# Patient Record
Sex: FEMALE | Race: WHITE
[De-identification: names, ages, dates, MRNs, and addresses within clinical notes are randomized per-mention and may not be internally consistent; named-entity substitution may affect disease eponyms.]

---

## 2017-09-22 ENCOUNTER — HOSPITAL ENCOUNTER (EMERGENCY)
Dept: HOSPITAL 83 - ED | Age: 27
Discharge: HOME | End: 2017-09-22
Payer: COMMERCIAL

## 2017-09-22 VITALS — BODY MASS INDEX: 28.32 KG/M2 | WEIGHT: 150 LBS | HEIGHT: 60.98 IN

## 2017-09-22 DIAGNOSIS — Z88.6: ICD-10-CM

## 2017-09-22 DIAGNOSIS — Z98.890: ICD-10-CM

## 2017-09-22 DIAGNOSIS — Z88.8: ICD-10-CM

## 2017-09-22 DIAGNOSIS — Z88.2: ICD-10-CM

## 2017-09-22 DIAGNOSIS — Z90.49: ICD-10-CM

## 2017-09-22 DIAGNOSIS — R30.0: Primary | ICD-10-CM

## 2017-09-22 DIAGNOSIS — K21.9: ICD-10-CM

## 2017-09-22 DIAGNOSIS — Z79.899: ICD-10-CM

## 2017-09-22 DIAGNOSIS — Z88.1: ICD-10-CM

## 2017-09-22 LAB
APPEARANCE UR: (no result)
BACTERIA #/AREA URNS HPF: (no result) /[HPF]
BILIRUB UR QL STRIP: NEGATIVE
COLOR UR: YELLOW
EPI CELLS #/AREA URNS HPF: (no result) /[HPF]
GLUCOSE UR QL: NEGATIVE
HGB UR QL STRIP: NEGATIVE
KETONES UR QL STRIP: NEGATIVE
LEUKOCYTE ESTERASE UR QL STRIP: NEGATIVE
NITRITE UR QL STRIP: NEGATIVE
PH UR STRIP: 7 [PH] (ref 5–9)
RBC #/AREA URNS HPF: (no result) RBC/HPF (ref 0–2)
SP GR UR: 1.01 (ref 1–1.03)
UROBILINOGEN UR STRIP-MCNC: 0.2 E.U./DL (ref 0.2–1)
WBC #/AREA URNS HPF: (no result) WBC/HPF (ref 0–5)

## 2017-10-05 ENCOUNTER — HOSPITAL ENCOUNTER (EMERGENCY)
Dept: HOSPITAL 83 - ED | Age: 27
LOS: 1 days | Discharge: HOME | End: 2017-10-06
Payer: COMMERCIAL

## 2017-10-05 VITALS — WEIGHT: 154 LBS | BODY MASS INDEX: 30.23 KG/M2 | HEIGHT: 60 IN

## 2017-10-05 DIAGNOSIS — Z88.1: ICD-10-CM

## 2017-10-05 DIAGNOSIS — Z88.2: ICD-10-CM

## 2017-10-05 DIAGNOSIS — R10.9: Primary | ICD-10-CM

## 2017-10-05 DIAGNOSIS — R11.2: ICD-10-CM

## 2017-10-05 LAB
ALBUMIN SERPL-MCNC: 3.9 GM/DL (ref 3.1–4.5)
ALP SERPL-CCNC: 48 U/L (ref 45–117)
ALT SERPL W P-5'-P-CCNC: 20 U/L (ref 12–78)
APPEARANCE UR: CLEAR
AST SERPL-CCNC: 15 IU/L (ref 3–35)
B-HCG SERPL-ACNC: NEGATIVE
BACTERIA #/AREA URNS HPF: (no result) /[HPF]
BASOPHILS # BLD AUTO: 0 10*3/UL (ref 0–0.1)
BASOPHILS NFR BLD AUTO: 0.4 % (ref 0–1)
BILIRUB UR QL STRIP: NEGATIVE
BUN SERPL-MCNC: 11 MG/DL (ref 7–24)
CHLORIDE SERPL-SCNC: 106 MMOL/L (ref 98–107)
COLOR UR: YELLOW
CREAT SERPL-MCNC: 0.66 MG/DL (ref 0.55–1.02)
EOSINOPHIL # BLD AUTO: 0.2 10*3/UL (ref 0–0.4)
EOSINOPHIL # BLD AUTO: 3 % (ref 1–4)
EPI CELLS #/AREA URNS HPF: (no result) /[HPF]
ERYTHROCYTE [DISTWIDTH] IN BLOOD BY AUTOMATED COUNT: 13.4 % (ref 0–14.5)
GLUCOSE UR QL: NEGATIVE
HCT VFR BLD AUTO: 38.6 % (ref 37–47)
HGB BLD-MCNC: 12.8 G/DL (ref 12–16)
HGB UR QL STRIP: (no result)
KETONES UR QL STRIP: NEGATIVE
LEUKOCYTE ESTERASE UR QL STRIP: (no result)
LYMPHOCYTES # BLD AUTO: 1.6 10*3/UL (ref 1.3–4.4)
LYMPHOCYTES NFR BLD AUTO: 24.2 % (ref 27–41)
MCH RBC QN AUTO: 29.6 PG (ref 27–31)
MCHC RBC AUTO-ENTMCNC: 33.2 G/DL (ref 33–37)
MCV RBC AUTO: 89.4 FL (ref 81–99)
MONOCYTES # BLD AUTO: 0.9 10*3/UL (ref 0.1–1)
MONOCYTES NFR BLD MANUAL: 13.2 % (ref 3–9)
NEUT #: 3.9 10*3/UL (ref 2.3–7.9)
NEUT %: 58.8 % (ref 47–73)
NITRITE UR QL STRIP: NEGATIVE
NRBC BLD QL AUTO: 0 % (ref 0–0)
PH UR STRIP: 7 [PH] (ref 5–9)
PLATELET # BLD AUTO: 236 10*3/UL (ref 130–400)
PMV BLD AUTO: 9.8 FL (ref 9.6–12.3)
POTASSIUM SERPL-SCNC: 3.8 MMOL/L (ref 3.5–5.1)
PROT SERPL-MCNC: 7.5 GM/DL (ref 6.4–8.2)
RBC # BLD AUTO: 4.32 10*6/UL (ref 4.1–5.1)
RBC #/AREA URNS HPF: (no result) RBC/HPF (ref 0–2)
SODIUM SERPL-SCNC: 140 MMOL/L (ref 136–145)
SP GR UR: 1.01 (ref 1–1.03)
UROBILINOGEN UR STRIP-MCNC: 1 E.U./DL (ref 0.2–1)
WBC #/AREA URNS HPF: (no result) WBC/HPF (ref 0–5)
WBC NRBC COR # BLD AUTO: 6.7 10*3/UL (ref 4.8–10.8)

## 2017-10-11 ENCOUNTER — HOSPITAL ENCOUNTER (EMERGENCY)
Dept: HOSPITAL 83 - ED | Age: 27
Discharge: HOME | End: 2017-10-11
Payer: COMMERCIAL

## 2017-10-11 VITALS — WEIGHT: 154 LBS | BODY MASS INDEX: 30.23 KG/M2 | HEIGHT: 60 IN

## 2017-10-11 DIAGNOSIS — F41.9: Primary | ICD-10-CM

## 2017-10-11 DIAGNOSIS — Z88.2: ICD-10-CM

## 2017-10-11 DIAGNOSIS — Z88.1: ICD-10-CM

## 2017-10-11 DIAGNOSIS — Z88.6: ICD-10-CM

## 2017-10-11 DIAGNOSIS — F17.200: ICD-10-CM

## 2017-10-19 LAB
BASOPHILS # BLD AUTO: 0 10*3/UL (ref 0–0.1)
BASOPHILS NFR BLD AUTO: 0.6 % (ref 0–1)
EOSINOPHIL # BLD AUTO: 0.1 10*3/UL (ref 0–0.4)
EOSINOPHIL # BLD AUTO: 1.8 % (ref 1–4)
ERYTHROCYTE [DISTWIDTH] IN BLOOD BY AUTOMATED COUNT: 13.7 % (ref 0–14.5)
HCT VFR BLD AUTO: 42.1 % (ref 37–47)
HGB BLD-MCNC: 13.9 G/DL (ref 12–16)
LYMPHOCYTES # BLD AUTO: 1.5 10*3/UL (ref 1.3–4.4)
LYMPHOCYTES NFR BLD AUTO: 30.5 % (ref 27–41)
MCH RBC QN AUTO: 30.2 PG (ref 27–31)
MCHC RBC AUTO-ENTMCNC: 33 G/DL (ref 33–37)
MCV RBC AUTO: 91.3 FL (ref 81–99)
MONOCYTES # BLD AUTO: 0.6 10*3/UL (ref 0.1–1)
MONOCYTES NFR BLD MANUAL: 11.6 % (ref 3–9)
NEUT #: 2.7 10*3/UL (ref 2.3–7.9)
NEUT %: 54.9 % (ref 47–73)
NRBC BLD QL AUTO: 0 10*3/UL (ref 0–0)
PLATELET # BLD AUTO: 234 10*3/UL (ref 130–400)
PMV BLD AUTO: 9.7 FL (ref 9.6–12.3)
RBC # BLD AUTO: 4.61 10*6/UL (ref 4.1–5.1)
WBC NRBC COR # BLD AUTO: 5 10*3/UL (ref 4.8–10.8)

## 2017-10-23 ENCOUNTER — HOSPITAL ENCOUNTER (OUTPATIENT)
Dept: HOSPITAL 83 - SDC | Age: 27
Discharge: HOME | End: 2017-10-23
Attending: OBSTETRICS & GYNECOLOGY
Payer: COMMERCIAL

## 2017-10-23 VITALS — SYSTOLIC BLOOD PRESSURE: 102 MMHG | DIASTOLIC BLOOD PRESSURE: 65 MMHG

## 2017-10-23 VITALS — HEIGHT: 60 IN | WEIGHT: 150 LBS | BODY MASS INDEX: 29.45 KG/M2

## 2017-10-23 VITALS — DIASTOLIC BLOOD PRESSURE: 63 MMHG | SYSTOLIC BLOOD PRESSURE: 102 MMHG

## 2017-10-23 VITALS — DIASTOLIC BLOOD PRESSURE: 68 MMHG | SYSTOLIC BLOOD PRESSURE: 110 MMHG

## 2017-10-23 VITALS — SYSTOLIC BLOOD PRESSURE: 112 MMHG | DIASTOLIC BLOOD PRESSURE: 67 MMHG

## 2017-10-23 VITALS — SYSTOLIC BLOOD PRESSURE: 102 MMHG | DIASTOLIC BLOOD PRESSURE: 57 MMHG

## 2017-10-23 VITALS — DIASTOLIC BLOOD PRESSURE: 46 MMHG

## 2017-10-23 VITALS — DIASTOLIC BLOOD PRESSURE: 51 MMHG

## 2017-10-23 DIAGNOSIS — N30.10: ICD-10-CM

## 2017-10-23 DIAGNOSIS — Z80.9: ICD-10-CM

## 2017-10-23 DIAGNOSIS — N80.3: Primary | ICD-10-CM

## 2017-10-23 DIAGNOSIS — K66.0: ICD-10-CM

## 2017-10-23 DIAGNOSIS — K21.9: ICD-10-CM

## 2017-10-23 DIAGNOSIS — F17.210: ICD-10-CM

## 2017-10-23 DIAGNOSIS — Z82.49: ICD-10-CM

## 2017-10-23 DIAGNOSIS — F41.0: ICD-10-CM

## 2017-10-23 DIAGNOSIS — Z98.890: ICD-10-CM

## 2017-10-23 DIAGNOSIS — N94.10: ICD-10-CM

## 2017-10-23 DIAGNOSIS — Z98.51: ICD-10-CM

## 2017-10-23 DIAGNOSIS — E66.01: ICD-10-CM

## 2018-01-01 ENCOUNTER — HOSPITAL ENCOUNTER (EMERGENCY)
Dept: HOSPITAL 83 - ED | Age: 28
Discharge: HOME | End: 2018-01-01
Payer: COMMERCIAL

## 2018-01-01 VITALS — BODY MASS INDEX: 29.45 KG/M2 | HEIGHT: 60 IN | WEIGHT: 150 LBS

## 2018-01-01 DIAGNOSIS — Z98.51: ICD-10-CM

## 2018-01-01 DIAGNOSIS — Z79.899: ICD-10-CM

## 2018-01-01 DIAGNOSIS — Z88.1: ICD-10-CM

## 2018-01-01 DIAGNOSIS — Z88.8: ICD-10-CM

## 2018-01-01 DIAGNOSIS — N39.0: Primary | ICD-10-CM

## 2018-01-01 DIAGNOSIS — Z88.6: ICD-10-CM

## 2018-01-01 DIAGNOSIS — Z88.2: ICD-10-CM

## 2018-01-01 LAB
APPEARANCE UR: CLEAR
BACTERIA #/AREA URNS HPF: (no result) /[HPF]
BILIRUB UR QL STRIP: NEGATIVE
COLOR UR: YELLOW
EPI CELLS #/AREA URNS HPF: (no result) /[HPF]
GLUCOSE UR QL: NEGATIVE
HGB UR QL STRIP: NEGATIVE
KETONES UR QL STRIP: NEGATIVE
LEUKOCYTE ESTERASE UR QL STRIP: NEGATIVE
NITRITE UR QL STRIP: NEGATIVE
PH UR STRIP: 5 [PH] (ref 5–9)
RBC #/AREA URNS HPF: (no result) RBC/HPF (ref 0–2)
SP GR UR: 1.01 (ref 1–1.03)
UROBILINOGEN UR STRIP-MCNC: 0.2 E.U./DL (ref 0.2–1)
WBC #/AREA URNS HPF: (no result) WBC/HPF (ref 0–5)
YEAST #/AREA URNS HPF: (no result) /[HPF]

## 2018-02-28 ENCOUNTER — HOSPITAL ENCOUNTER (EMERGENCY)
Dept: HOSPITAL 83 - ED | Age: 28
Discharge: HOME | End: 2018-02-28
Payer: COMMERCIAL

## 2018-02-28 VITALS — WEIGHT: 140 LBS | BODY MASS INDEX: 27.48 KG/M2 | HEIGHT: 60 IN

## 2018-02-28 DIAGNOSIS — Z88.8: ICD-10-CM

## 2018-02-28 DIAGNOSIS — Z79.899: ICD-10-CM

## 2018-02-28 DIAGNOSIS — B34.9: Primary | ICD-10-CM

## 2018-02-28 DIAGNOSIS — Z88.2: ICD-10-CM

## 2018-02-28 DIAGNOSIS — Z88.1: ICD-10-CM

## 2018-02-28 DIAGNOSIS — Z88.6: ICD-10-CM

## 2018-04-19 ENCOUNTER — HOSPITAL ENCOUNTER (EMERGENCY)
Dept: HOSPITAL 83 - ED | Age: 28
Discharge: HOME | End: 2018-04-19
Payer: COMMERCIAL

## 2018-04-19 VITALS — WEIGHT: 142 LBS | BODY MASS INDEX: 26.81 KG/M2 | HEIGHT: 60.98 IN

## 2018-04-19 DIAGNOSIS — Z79.899: ICD-10-CM

## 2018-04-19 DIAGNOSIS — Z90.49: ICD-10-CM

## 2018-04-19 DIAGNOSIS — Z88.2: ICD-10-CM

## 2018-04-19 DIAGNOSIS — Z88.1: ICD-10-CM

## 2018-04-19 DIAGNOSIS — Z88.8: ICD-10-CM

## 2018-04-19 DIAGNOSIS — Z98.51: ICD-10-CM

## 2018-04-19 DIAGNOSIS — R30.0: Primary | ICD-10-CM

## 2018-04-19 DIAGNOSIS — Z98.890: ICD-10-CM

## 2018-04-19 LAB
APPEARANCE UR: CLEAR
BACTERIA #/AREA URNS HPF: (no result) /[HPF]
BILIRUB UR QL STRIP: NEGATIVE
COLOR UR: YELLOW
EPI CELLS #/AREA URNS HPF: (no result) /[HPF]
GLUCOSE UR QL: NEGATIVE
HGB UR QL STRIP: NEGATIVE
KETONES UR QL STRIP: NEGATIVE
LEUKOCYTE ESTERASE UR QL STRIP: NEGATIVE
NITRITE UR QL STRIP: NEGATIVE
PH UR STRIP: 6.5 [PH] (ref 5–9)
RBC #/AREA URNS HPF: (no result) RBC/HPF (ref 0–2)
SP GR UR: 1.01 (ref 1–1.03)
UROBILINOGEN UR STRIP-MCNC: 0.2 E.U./DL (ref 0.2–1)
WBC #/AREA URNS HPF: (no result) WBC/HPF (ref 0–5)

## 2018-04-21 ENCOUNTER — HOSPITAL ENCOUNTER (OUTPATIENT)
Dept: HOSPITAL 83 - LAB | Age: 28
Discharge: HOME | End: 2018-04-21
Attending: UROLOGY
Payer: COMMERCIAL

## 2018-04-21 DIAGNOSIS — N28.89: ICD-10-CM

## 2018-04-21 DIAGNOSIS — N32.89: Primary | ICD-10-CM

## 2018-04-21 DIAGNOSIS — N39.0: ICD-10-CM

## 2018-04-21 LAB
APPEARANCE UR: CLEAR
BACTERIA #/AREA URNS HPF: (no result) /[HPF]
BILIRUB UR QL STRIP: NEGATIVE
COLOR UR: YELLOW
EPI CELLS #/AREA URNS HPF: (no result) /[HPF]
GLUCOSE UR QL: NEGATIVE
HGB UR QL STRIP: NEGATIVE
KETONES UR QL STRIP: NEGATIVE
LEUKOCYTE ESTERASE UR QL STRIP: NEGATIVE
NITRITE UR QL STRIP: NEGATIVE
PH UR STRIP: 7 [PH] (ref 5–9)
SP GR UR: <= 1.005 (ref 1–1.03)
UROBILINOGEN UR STRIP-MCNC: 0.2 E.U./DL (ref 0.2–1)
WBC #/AREA URNS HPF: (no result) WBC/HPF (ref 0–5)

## 2018-06-23 ENCOUNTER — HOSPITAL ENCOUNTER (EMERGENCY)
Dept: HOSPITAL 83 - ED | Age: 28
Discharge: HOME | End: 2018-06-23
Payer: COMMERCIAL

## 2018-06-23 VITALS — HEIGHT: 60.98 IN | WEIGHT: 150 LBS | BODY MASS INDEX: 28.32 KG/M2

## 2018-06-23 DIAGNOSIS — Z79.899: ICD-10-CM

## 2018-06-23 DIAGNOSIS — N39.0: Primary | ICD-10-CM

## 2018-06-23 DIAGNOSIS — Z88.2: ICD-10-CM

## 2018-06-23 DIAGNOSIS — Z88.1: ICD-10-CM

## 2018-06-23 DIAGNOSIS — Z88.8: ICD-10-CM

## 2018-06-23 LAB
APPEARANCE UR: CLEAR
BACTERIA #/AREA URNS HPF: (no result) /[HPF]
BILIRUB UR QL STRIP: NEGATIVE
COLOR UR: YELLOW
EPI CELLS #/AREA URNS HPF: (no result) /[HPF]
GLUCOSE UR QL: NEGATIVE
HGB UR QL STRIP: NEGATIVE
KETONES UR QL STRIP: NEGATIVE
LEUKOCYTE ESTERASE UR QL STRIP: (no result)
MUCOUS THREADS URNS QL MICRO: (no result)
NITRITE UR QL STRIP: NEGATIVE
PH UR STRIP: 6 [PH] (ref 5–9)
RBC #/AREA URNS HPF: (no result) RBC/HPF (ref 0–2)
SP GR UR: 1.02 (ref 1–1.03)
UROBILINOGEN UR STRIP-MCNC: 0.2 E.U./DL (ref 0.2–1)
WBC #/AREA URNS HPF: (no result) WBC/HPF (ref 0–5)

## 2018-08-02 ENCOUNTER — HOSPITAL ENCOUNTER (EMERGENCY)
Dept: HOSPITAL 83 - ED | Age: 28
Discharge: HOME | End: 2018-08-02
Payer: COMMERCIAL

## 2018-08-02 VITALS — HEIGHT: 60.98 IN | WEIGHT: 145 LBS | BODY MASS INDEX: 27.38 KG/M2

## 2018-08-02 DIAGNOSIS — Z88.2: ICD-10-CM

## 2018-08-02 DIAGNOSIS — Z88.1: ICD-10-CM

## 2018-08-02 DIAGNOSIS — A08.4: ICD-10-CM

## 2018-08-02 DIAGNOSIS — Z90.49: ICD-10-CM

## 2018-08-02 DIAGNOSIS — Z98.51: ICD-10-CM

## 2018-08-02 DIAGNOSIS — N20.0: Primary | ICD-10-CM

## 2018-08-02 DIAGNOSIS — K21.9: ICD-10-CM

## 2018-08-02 DIAGNOSIS — Z79.899: ICD-10-CM

## 2018-08-02 DIAGNOSIS — Z98.890: ICD-10-CM

## 2018-08-02 LAB
ALBUMIN SERPL-MCNC: 4.1 GM/DL (ref 3.1–4.5)
ALP SERPL-CCNC: 44 U/L (ref 45–117)
ALT SERPL W P-5'-P-CCNC: 19 U/L (ref 12–78)
APPEARANCE UR: CLEAR
AST SERPL-CCNC: 23 IU/L (ref 3–35)
BACTERIA #/AREA URNS HPF: (no result) /[HPF]
BASOPHILS # BLD AUTO: 0 10*3/UL (ref 0–0.1)
BASOPHILS NFR BLD AUTO: 0.5 % (ref 0–1)
BILIRUB UR QL STRIP: NEGATIVE
BUN SERPL-MCNC: 12 MG/DL (ref 7–24)
CHLORIDE SERPL-SCNC: 106 MMOL/L (ref 98–107)
COLOR UR: YELLOW
CREAT SERPL-MCNC: 0.99 MG/DL (ref 0.55–1.02)
EOSINOPHIL # BLD AUTO: 0.2 10*3/UL (ref 0–0.4)
EOSINOPHIL # BLD AUTO: 2.8 % (ref 1–4)
ERYTHROCYTE [DISTWIDTH] IN BLOOD BY AUTOMATED COUNT: 13.1 % (ref 0–14.5)
GLUCOSE UR QL: NEGATIVE
HCT VFR BLD AUTO: 40.8 % (ref 37–47)
HGB BLD-MCNC: 13.7 G/DL (ref 12–16)
HGB UR QL STRIP: NEGATIVE
KETONES UR QL STRIP: (no result)
LEUKOCYTE ESTERASE UR QL STRIP: NEGATIVE
LIPASE SERPL-CCNC: 81 U/L (ref 73–393)
LYMPHOCYTES # BLD AUTO: 1.8 10*3/UL (ref 1.3–4.4)
LYMPHOCYTES NFR BLD AUTO: 28.7 % (ref 27–41)
MCH RBC QN AUTO: 30 PG (ref 27–31)
MCHC RBC AUTO-ENTMCNC: 33.6 G/DL (ref 33–37)
MCV RBC AUTO: 89.5 FL (ref 81–99)
MONOCYTES # BLD AUTO: 0.7 10*3/UL (ref 0.1–1)
MONOCYTES NFR BLD MANUAL: 10.7 % (ref 3–9)
MUCOUS THREADS URNS QL MICRO: (no result)
NEUT #: 3.6 10*3/UL (ref 2.3–7.9)
NEUT %: 57 % (ref 47–73)
NITRITE UR QL STRIP: NEGATIVE
NRBC BLD QL AUTO: 0 10*3/UL (ref 0–0)
PH UR STRIP: 7 [PH] (ref 5–9)
PLATELET # BLD AUTO: 246 10*3/UL (ref 130–400)
PMV BLD AUTO: 10.3 FL (ref 9.6–12.3)
POTASSIUM SERPL-SCNC: 4.2 MMOL/L (ref 3.5–5.1)
PROT SERPL-MCNC: 7.7 GM/DL (ref 6.4–8.2)
RBC # BLD AUTO: 4.56 10*6/UL (ref 4.1–5.1)
SODIUM SERPL-SCNC: 140 MMOL/L (ref 136–145)
SP GR UR: 1.01 (ref 1–1.03)
UROBILINOGEN UR STRIP-MCNC: 1 E.U./DL (ref 0.2–1)
WBC #/AREA URNS HPF: (no result) WBC/HPF (ref 0–5)
WBC NRBC COR # BLD AUTO: 6.4 10*3/UL (ref 4.8–10.8)

## 2018-09-14 ENCOUNTER — HOSPITAL ENCOUNTER (EMERGENCY)
Dept: HOSPITAL 83 - ED | Age: 28
Discharge: HOME | End: 2018-09-14
Payer: COMMERCIAL

## 2018-09-14 VITALS — BODY MASS INDEX: 28.47 KG/M2 | WEIGHT: 145 LBS | HEIGHT: 60 IN

## 2018-09-14 DIAGNOSIS — Z79.899: ICD-10-CM

## 2018-09-14 DIAGNOSIS — Z87.891: ICD-10-CM

## 2018-09-14 DIAGNOSIS — Z88.2: ICD-10-CM

## 2018-09-14 DIAGNOSIS — Z88.8: ICD-10-CM

## 2018-09-14 DIAGNOSIS — K21.9: ICD-10-CM

## 2018-09-14 DIAGNOSIS — Z88.1: ICD-10-CM

## 2018-09-14 DIAGNOSIS — J06.9: Primary | ICD-10-CM

## 2018-09-14 DIAGNOSIS — Z88.6: ICD-10-CM

## 2018-09-14 DIAGNOSIS — Z90.49: ICD-10-CM

## 2018-09-22 ENCOUNTER — HOSPITAL ENCOUNTER (EMERGENCY)
Dept: HOSPITAL 83 - ED | Age: 28
Discharge: HOME | End: 2018-09-22
Payer: COMMERCIAL

## 2018-09-22 VITALS — WEIGHT: 145 LBS | HEIGHT: 60.98 IN | BODY MASS INDEX: 27.38 KG/M2

## 2018-09-22 DIAGNOSIS — Z88.8: ICD-10-CM
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
## 2018-09-22 DIAGNOSIS — K21.9: ICD-10-CM
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
## 2018-09-22 DIAGNOSIS — Z88.1: ICD-10-CM

## 2018-09-22 DIAGNOSIS — Z87.442: ICD-10-CM

## 2018-09-22 DIAGNOSIS — Z88.2: ICD-10-CM
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
## 2018-09-22 DIAGNOSIS — S39.012A: Primary | ICD-10-CM
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
## 2018-09-22 DIAGNOSIS — Z88.6: ICD-10-CM

## 2018-09-22 DIAGNOSIS — Z79.899: ICD-10-CM

## 2018-09-22 LAB
APPEARANCE UR: CLEAR
BILIRUB UR QL STRIP: NEGATIVE
COLOR UR: YELLOW
EPI CELLS #/AREA URNS HPF: (no result) /[HPF]
GLUCOSE UR QL: NEGATIVE
HGB UR QL STRIP: (no result)
KETONES UR QL STRIP: NEGATIVE
LEUKOCYTE ESTERASE UR QL STRIP: NEGATIVE
NITRITE UR QL STRIP: NEGATIVE
PH UR STRIP: 6 [PH] (ref 5–9)
RBC #/AREA URNS HPF: (no result) RBC/HPF (ref 0–2)
SP GR UR: 1.01 (ref 1–1.03)
UROBILINOGEN UR STRIP-MCNC: 0.2 E.U./DL (ref 0.2–1)

## 2018-10-07 ENCOUNTER — HOSPITAL ENCOUNTER (EMERGENCY)
Dept: HOSPITAL 83 - ED | Age: 28
Discharge: HOME | End: 2018-10-07
Payer: COMMERCIAL

## 2018-10-07 VITALS — HEIGHT: 60.98 IN | BODY MASS INDEX: 27.38 KG/M2 | WEIGHT: 145 LBS

## 2018-10-07 DIAGNOSIS — Z88.1: ICD-10-CM

## 2018-10-07 DIAGNOSIS — Z88.8: ICD-10-CM

## 2018-10-07 DIAGNOSIS — N39.0: Primary | ICD-10-CM

## 2018-10-07 DIAGNOSIS — Z88.6: ICD-10-CM

## 2018-10-07 DIAGNOSIS — Z88.2: ICD-10-CM

## 2018-10-07 DIAGNOSIS — Z79.899: ICD-10-CM

## 2018-12-22 ENCOUNTER — HOSPITAL ENCOUNTER (EMERGENCY)
Dept: HOSPITAL 83 - ED | Age: 28
Discharge: HOME | End: 2018-12-22
Payer: COMMERCIAL

## 2018-12-22 VITALS — BODY MASS INDEX: 27.94 KG/M2 | WEIGHT: 148 LBS | HEIGHT: 60.98 IN

## 2018-12-22 DIAGNOSIS — Z88.8: ICD-10-CM

## 2018-12-22 DIAGNOSIS — Z79.2: ICD-10-CM

## 2018-12-22 DIAGNOSIS — N89.8: Primary | ICD-10-CM

## 2018-12-22 DIAGNOSIS — Z88.6: ICD-10-CM

## 2018-12-22 DIAGNOSIS — K21.9: ICD-10-CM

## 2018-12-22 DIAGNOSIS — Z79.899: ICD-10-CM

## 2018-12-22 DIAGNOSIS — R31.9: ICD-10-CM

## 2018-12-22 DIAGNOSIS — R35.0: ICD-10-CM

## 2018-12-22 DIAGNOSIS — Z88.2: ICD-10-CM

## 2018-12-22 DIAGNOSIS — Z88.1: ICD-10-CM

## 2018-12-22 LAB
APPEARANCE UR: (no result)
BACTERIA #/AREA URNS HPF: (no result) /[HPF]
BILIRUB UR QL STRIP: NEGATIVE
COLOR UR: YELLOW
GLUCOSE UR QL: NEGATIVE
HGB UR QL STRIP: NEGATIVE
KETONES UR QL STRIP: NEGATIVE
LEUKOCYTE ESTERASE UR QL STRIP: NEGATIVE
MUCOUS THREADS URNS QL MICRO: (no result)
NITRITE UR QL STRIP: NEGATIVE
PH UR STRIP: 7 [PH] (ref 5–9)
RBC #/AREA URNS HPF: (no result) RBC/HPF (ref 0–2)
SP GR UR: 1.02 (ref 1–1.03)
UROBILINOGEN UR STRIP-MCNC: 0.2 E.U./DL (ref 0.2–1)
WBC #/AREA URNS HPF: (no result) WBC/HPF (ref 0–5)

## 2019-01-18 ENCOUNTER — APPOINTMENT (OUTPATIENT)
Dept: CT IMAGING | Age: 29
End: 2019-01-18
Payer: MEDICAID

## 2019-01-18 ENCOUNTER — HOSPITAL ENCOUNTER (EMERGENCY)
Age: 29
Discharge: HOME | End: 2019-01-18
Attending: EMERGENCY MEDICINE
Payer: MEDICAID

## 2019-01-18 VITALS
HEIGHT: 61 IN | BODY MASS INDEX: 27.38 KG/M2 | TEMPERATURE: 98 F | OXYGEN SATURATION: 100 % | RESPIRATION RATE: 14 BRPM | WEIGHT: 145 LBS | HEART RATE: 78 BPM | SYSTOLIC BLOOD PRESSURE: 134 MMHG | DIASTOLIC BLOOD PRESSURE: 80 MMHG

## 2019-01-18 DIAGNOSIS — K59.00 CONSTIPATION, UNSPECIFIED CONSTIPATION TYPE: ICD-10-CM

## 2019-01-18 DIAGNOSIS — R10.9 ABDOMINAL PAIN, UNSPECIFIED ABDOMINAL LOCATION: Primary | ICD-10-CM

## 2019-01-18 LAB
ALBUMIN SERPL-MCNC: 4.2 G/DL (ref 3.5–5.2)
ALP BLD-CCNC: 40 U/L (ref 35–104)
ALT SERPL-CCNC: 12 U/L (ref 0–32)
ANION GAP SERPL CALCULATED.3IONS-SCNC: 11 MMOL/L (ref 7–16)
AST SERPL-CCNC: 16 U/L (ref 0–31)
BACTERIA: NORMAL /HPF
BASOPHILS ABSOLUTE: 0.03 E9/L (ref 0–0.2)
BASOPHILS RELATIVE PERCENT: 0.4 % (ref 0–2)
BILIRUB SERPL-MCNC: 0.5 MG/DL (ref 0–1.2)
BILIRUBIN DIRECT: <0.2 MG/DL (ref 0–0.3)
BILIRUBIN URINE: NEGATIVE
BILIRUBIN, INDIRECT: NORMAL MG/DL (ref 0–1)
BLOOD, URINE: NEGATIVE
BUN BLDV-MCNC: 11 MG/DL (ref 6–20)
CALCIUM SERPL-MCNC: 9.5 MG/DL (ref 8.6–10.2)
CHLORIDE BLD-SCNC: 100 MMOL/L (ref 98–107)
CLARITY: CLEAR
CO2: 24 MMOL/L (ref 22–29)
COLOR: YELLOW
CREAT SERPL-MCNC: 0.6 MG/DL (ref 0.5–1)
EOSINOPHILS ABSOLUTE: 0.16 E9/L (ref 0.05–0.5)
EOSINOPHILS RELATIVE PERCENT: 2.1 % (ref 0–6)
GFR AFRICAN AMERICAN: >60
GFR NON-AFRICAN AMERICAN: >60 ML/MIN/1.73
GLUCOSE BLD-MCNC: 79 MG/DL (ref 74–99)
GLUCOSE URINE: NEGATIVE MG/DL
HCG, URINE, POC: NEGATIVE
HCT VFR BLD CALC: 40.1 % (ref 34–48)
HEMOGLOBIN: 13.4 G/DL (ref 11.5–15.5)
IMMATURE GRANULOCYTES #: 0.02 E9/L
IMMATURE GRANULOCYTES %: 0.3 % (ref 0–5)
KETONES, URINE: NEGATIVE MG/DL
LACTIC ACID: 0.6 MMOL/L (ref 0.5–2.2)
LEUKOCYTE ESTERASE, URINE: NEGATIVE
LIPASE: 19 U/L (ref 13–60)
LYMPHOCYTES ABSOLUTE: 2.28 E9/L (ref 1.5–4)
LYMPHOCYTES RELATIVE PERCENT: 29.7 % (ref 20–42)
Lab: NORMAL
MCH RBC QN AUTO: 30.4 PG (ref 26–35)
MCHC RBC AUTO-ENTMCNC: 33.4 % (ref 32–34.5)
MCV RBC AUTO: 90.9 FL (ref 80–99.9)
MONOCYTES ABSOLUTE: 0.86 E9/L (ref 0.1–0.95)
MONOCYTES RELATIVE PERCENT: 11.2 % (ref 2–12)
NEGATIVE QC PASS/FAIL: NORMAL
NEUTROPHILS ABSOLUTE: 4.32 E9/L (ref 1.8–7.3)
NEUTROPHILS RELATIVE PERCENT: 56.3 % (ref 43–80)
NITRITE, URINE: NEGATIVE
PDW BLD-RTO: 13 FL (ref 11.5–15)
PH UA: 7 (ref 5–9)
PLATELET # BLD: 223 E9/L (ref 130–450)
PMV BLD AUTO: 10 FL (ref 7–12)
POSITIVE QC PASS/FAIL: NORMAL
POTASSIUM SERPL-SCNC: 3.8 MMOL/L (ref 3.5–5)
PROTEIN UA: NEGATIVE MG/DL
RBC # BLD: 4.41 E12/L (ref 3.5–5.5)
RBC UA: NORMAL /HPF (ref 0–2)
SODIUM BLD-SCNC: 135 MMOL/L (ref 132–146)
SPECIFIC GRAVITY UA: <=1.005 (ref 1–1.03)
TOTAL PROTEIN: 7.3 G/DL (ref 6.4–8.3)
UROBILINOGEN, URINE: 0.2 E.U./DL
WBC # BLD: 7.7 E9/L (ref 4.5–11.5)
WBC UA: NORMAL /HPF (ref 0–5)

## 2019-01-18 PROCEDURE — 74176 CT ABD & PELVIS W/O CONTRAST: CPT

## 2019-01-18 PROCEDURE — 87088 URINE BACTERIA CULTURE: CPT

## 2019-01-18 PROCEDURE — 81001 URINALYSIS AUTO W/SCOPE: CPT

## 2019-01-18 PROCEDURE — 6360000002 HC RX W HCPCS: Performed by: EMERGENCY MEDICINE

## 2019-01-18 PROCEDURE — 2580000003 HC RX 258: Performed by: RADIOLOGY

## 2019-01-18 PROCEDURE — 6360000004 HC RX CONTRAST MEDICATION: Performed by: RADIOLOGY

## 2019-01-18 PROCEDURE — 83605 ASSAY OF LACTIC ACID: CPT

## 2019-01-18 PROCEDURE — 36415 COLL VENOUS BLD VENIPUNCTURE: CPT

## 2019-01-18 PROCEDURE — 83690 ASSAY OF LIPASE: CPT

## 2019-01-18 PROCEDURE — 85025 COMPLETE CBC W/AUTO DIFF WBC: CPT

## 2019-01-18 PROCEDURE — 96375 TX/PRO/DX INJ NEW DRUG ADDON: CPT

## 2019-01-18 PROCEDURE — 80076 HEPATIC FUNCTION PANEL: CPT

## 2019-01-18 PROCEDURE — 96374 THER/PROPH/DIAG INJ IV PUSH: CPT

## 2019-01-18 PROCEDURE — 2580000003 HC RX 258: Performed by: EMERGENCY MEDICINE

## 2019-01-18 PROCEDURE — 99284 EMERGENCY DEPT VISIT MOD MDM: CPT

## 2019-01-18 PROCEDURE — 80048 BASIC METABOLIC PNL TOTAL CA: CPT

## 2019-01-18 RX ORDER — DOCUSATE SODIUM 100 MG/1
100 CAPSULE, LIQUID FILLED ORAL 2 TIMES DAILY PRN
Qty: 20 CAPSULE | Refills: 0 | Status: SHIPPED | OUTPATIENT
Start: 2019-01-18 | End: 2019-01-23

## 2019-01-18 RX ORDER — OMEPRAZOLE 40 MG/1
40 CAPSULE, DELAYED RELEASE ORAL DAILY
COMMUNITY

## 2019-01-18 RX ORDER — KETOROLAC TROMETHAMINE 30 MG/ML
15 INJECTION, SOLUTION INTRAMUSCULAR; INTRAVENOUS ONCE
Status: COMPLETED | OUTPATIENT
Start: 2019-01-18 | End: 2019-01-18

## 2019-01-18 RX ORDER — SODIUM CHLORIDE 0.9 % (FLUSH) 0.9 %
10 SYRINGE (ML) INJECTION 2 TIMES DAILY
Status: DISCONTINUED | OUTPATIENT
Start: 2019-01-18 | End: 2019-01-19 | Stop reason: HOSPADM

## 2019-01-18 RX ORDER — 0.9 % SODIUM CHLORIDE 0.9 %
1000 INTRAVENOUS SOLUTION INTRAVENOUS ONCE
Status: COMPLETED | OUTPATIENT
Start: 2019-01-18 | End: 2019-01-18

## 2019-01-18 RX ORDER — ONDANSETRON 2 MG/ML
4 INJECTION INTRAMUSCULAR; INTRAVENOUS
Status: COMPLETED | OUTPATIENT
Start: 2019-01-18 | End: 2019-01-18

## 2019-01-18 RX ADMIN — KETOROLAC TROMETHAMINE 15 MG: 30 INJECTION, SOLUTION INTRAMUSCULAR; INTRAVENOUS at 20:59

## 2019-01-18 RX ADMIN — ONDANSETRON 4 MG: 2 INJECTION INTRAMUSCULAR; INTRAVENOUS at 21:00

## 2019-01-18 RX ADMIN — SODIUM CHLORIDE 1000 ML: 9 INJECTION, SOLUTION INTRAVENOUS at 20:59

## 2019-01-18 RX ADMIN — Medication 10 ML: at 21:39

## 2019-01-18 ASSESSMENT — ENCOUNTER SYMPTOMS
SINUS PRESSURE: 0
SORE THROAT: 0
HEMATOCHEZIA: 0
ABDOMINAL PAIN: 1
COUGH: 0
SHORTNESS OF BREATH: 0
WHEEZING: 0
ABDOMINAL DISTENTION: 0
EYE PAIN: 0
VOMITING: 0
RHINORRHEA: 0
EYE DISCHARGE: 0
BLOOD IN STOOL: 0
NAUSEA: 0
DIARRHEA: 0
CONSTIPATION: 0
BACK PAIN: 0
EYE REDNESS: 0
HEMATEMESIS: 0

## 2019-01-18 ASSESSMENT — PAIN DESCRIPTION - LOCATION: LOCATION: ABDOMEN

## 2019-01-18 ASSESSMENT — PAIN SCALES - GENERAL
PAINLEVEL_OUTOF10: 7

## 2019-01-18 ASSESSMENT — PAIN DESCRIPTION - PAIN TYPE: TYPE: ACUTE PAIN

## 2019-01-18 ASSESSMENT — PAIN DESCRIPTION - PROGRESSION: CLINICAL_PROGRESSION: NOT CHANGED

## 2019-01-21 LAB — URINE CULTURE, ROUTINE: NORMAL

## 2019-10-22 ENCOUNTER — HOSPITAL ENCOUNTER (OUTPATIENT)
Dept: HOSPITAL 83 - CARD | Age: 29
Discharge: HOME | End: 2019-10-22
Attending: INTERNAL MEDICINE
Payer: COMMERCIAL

## 2019-10-22 DIAGNOSIS — R07.9: Primary | ICD-10-CM

## 2019-10-22 DIAGNOSIS — K21.9: ICD-10-CM

## 2019-10-22 DIAGNOSIS — R06.02: ICD-10-CM

## 2019-10-22 NOTE — ST
[place], [place]
 
                               EXERCISE STRESS TEST REPORT
 
        NAME: FARIBA CUADRA                ACCT #: [id number]  
        UNIT #: [id number]                        ROOM:           
        DOCTOR: FLORENCE DELATORRE MD               BIRTHDATE: [birth year]
 
 
DOS: 10/22/2019
 
The patient walked on the Tobi protocol, duration of 5 minutes.  Heart rate is
165, which is 85% ____.  No EKG changes with exercise.  No chest pain with
exercise.  No dysrhythmia with exercise.  Blood pressure and heart rate response
was normal with adequate double product.
 
FINAL IMPRESSION:  No EKG changes with exercise.  No chest pain with exercise. 
No dysrhythmia with exercise.  Blood pressure and heart rate response was normal
with adequate double product.
 
 
 
_________________________________
FLORENCE DELATORRE MD
 
CM:STRESS:EXERCISE STRESS TEST REPORT 
 
 
D: 10/22/19 0720
T: 10/22/19 0752
    
                                                            
FLORENCE DELATORRE MD

## 2019-10-22 NOTE — NUR
INFORMED SIGNED CONSENT OBTAINED FOR STANDARD GXT WITH DR DELATORRE. RESTING EKG
NSR HR 88 /70, STANDING HR 98 BP 98/60. PT COMPLETED 5:00 OF A 2:00
HELEN PROTOCOL WITH PT COMPLETING 1:00 OF STAFE III AT 3.4 MPH AND A 14%
GRADE. PT REACHED A PEAK HR  WHICH REPRESENTS 87% OF PREDICTED MAXIMUM
AND A PEAK BP /80. NO ARRHYTHMIAS NOTED. NO ST CHANAGES SEEN. LAST
RECOVERY . PT IN STABLE CONDITION, HOME TO SELF.

## 2019-11-02 ENCOUNTER — HOSPITAL ENCOUNTER (EMERGENCY)
Dept: HOSPITAL 83 - ED | Age: 29
Discharge: HOME | End: 2019-11-02
Payer: COMMERCIAL

## 2019-11-02 VITALS — WEIGHT: 135 LBS | BODY MASS INDEX: 26.5 KG/M2 | HEIGHT: 60 IN

## 2019-11-02 DIAGNOSIS — N39.0: Primary | ICD-10-CM

## 2019-11-02 DIAGNOSIS — Z79.899: ICD-10-CM

## 2019-11-02 DIAGNOSIS — Z88.8: ICD-10-CM

## 2019-11-02 DIAGNOSIS — Z79.2: ICD-10-CM

## 2019-11-02 DIAGNOSIS — Z88.1: ICD-10-CM

## 2019-11-02 DIAGNOSIS — Z87.442: ICD-10-CM

## 2019-11-02 DIAGNOSIS — K21.9: ICD-10-CM

## 2019-11-02 DIAGNOSIS — E87.6: ICD-10-CM

## 2019-11-02 DIAGNOSIS — Z88.2: ICD-10-CM

## 2019-11-02 LAB
ALBUMIN SERPL-MCNC: 3.6 GM/DL (ref 3.1–4.5)
ALP SERPL-CCNC: 46 U/L (ref 45–117)
ALT SERPL W P-5'-P-CCNC: 22 U/L (ref 12–78)
APPEARANCE UR: (no result)
AST SERPL-CCNC: 19 IU/L (ref 3–35)
BACTERIA #/AREA URNS HPF: (no result) /[HPF]
BASOPHILS # BLD AUTO: 0 10*3/UL (ref 0–0.1)
BASOPHILS NFR BLD AUTO: 0.5 % (ref 0–1)
BILIRUB UR QL STRIP: NEGATIVE
BUN SERPL-MCNC: 9 MG/DL (ref 7–24)
CHLORIDE SERPL-SCNC: 107 MMOL/L (ref 98–107)
COLOR UR: YELLOW
CREAT SERPL-MCNC: 0.65 MG/DL (ref 0.55–1.02)
EOSINOPHIL # BLD AUTO: 0.1 10*3/UL (ref 0–0.4)
EOSINOPHIL # BLD AUTO: 2.3 % (ref 1–4)
EPI CELLS #/AREA URNS HPF: (no result) /[HPF]
ERYTHROCYTE [DISTWIDTH] IN BLOOD BY AUTOMATED COUNT: 12.9 % (ref 0–14.5)
GLUCOSE UR QL: NEGATIVE
HCT VFR BLD AUTO: 38.9 % (ref 37–47)
HGB BLD-MCNC: 12.9 G/DL (ref 12–16)
HGB UR QL STRIP: NEGATIVE
KETONES UR QL STRIP: NEGATIVE
LEUKOCYTE ESTERASE UR QL STRIP: NEGATIVE
LIPASE SERPL-CCNC: 142 U/L (ref 73–393)
LYMPHOCYTES # BLD AUTO: 2.3 10*3/UL (ref 1.3–4.4)
LYMPHOCYTES NFR BLD AUTO: 38.2 % (ref 27–41)
MCH RBC QN AUTO: 30.6 PG (ref 27–31)
MCHC RBC AUTO-ENTMCNC: 33.2 G/DL (ref 33–37)
MCV RBC AUTO: 92.2 FL (ref 81–99)
MONOCYTES # BLD AUTO: 0.7 10*3/UL (ref 0.1–1)
MONOCYTES NFR BLD MANUAL: 12.1 % (ref 3–9)
NEUT #: 2.9 10*3/UL (ref 2.3–7.9)
NEUT %: 46.6 % (ref 47–73)
NITRITE UR QL STRIP: NEGATIVE
NRBC BLD QL AUTO: 0 % (ref 0–0)
PH UR STRIP: 7 [PH] (ref 5–9)
PLATELET # BLD AUTO: 234 10*3/UL (ref 130–400)
PMV BLD AUTO: 9.8 FL (ref 9.6–12.3)
POTASSIUM SERPL-SCNC: 3.4 MMOL/L (ref 3.5–5.1)
PROT SERPL-MCNC: 7 GM/DL (ref 6.4–8.2)
RBC # BLD AUTO: 4.22 10*6/UL (ref 4.1–5.1)
SODIUM SERPL-SCNC: 140 MMOL/L (ref 136–145)
SP GR UR: 1.01 (ref 1–1.03)
UROBILINOGEN UR STRIP-MCNC: 0.2 E.U./DL (ref 0.2–1)
WBC #/AREA URNS HPF: (no result) WBC/HPF (ref 0–5)
WBC NRBC COR # BLD AUTO: 6.1 10*3/UL (ref 4.8–10.8)

## 2020-01-06 ENCOUNTER — HOSPITAL ENCOUNTER (EMERGENCY)
Dept: HOSPITAL 83 - ED | Age: 30
Discharge: HOME | End: 2020-01-06
Payer: COMMERCIAL

## 2020-01-06 VITALS — HEIGHT: 60.98 IN | WEIGHT: 140 LBS | BODY MASS INDEX: 26.43 KG/M2

## 2020-01-06 DIAGNOSIS — N39.0: Primary | ICD-10-CM

## 2020-01-06 DIAGNOSIS — R51: ICD-10-CM

## 2020-01-06 DIAGNOSIS — N93.9: ICD-10-CM

## 2020-01-06 DIAGNOSIS — Z88.8: ICD-10-CM

## 2020-01-06 DIAGNOSIS — Z88.1: ICD-10-CM

## 2020-01-06 DIAGNOSIS — Z79.899: ICD-10-CM

## 2020-01-06 DIAGNOSIS — Z88.2: ICD-10-CM

## 2020-01-06 LAB
ALBUMIN SERPL-MCNC: 3.9 GM/DL (ref 3.1–4.5)
ALP SERPL-CCNC: 37 U/L (ref 45–117)
ALT SERPL W P-5'-P-CCNC: 20 U/L (ref 12–78)
APPEARANCE UR: (no result)
AST SERPL-CCNC: 15 IU/L (ref 3–35)
BACTERIA #/AREA URNS HPF: (no result) /[HPF]
BASOPHILS # BLD AUTO: 0 10*3/UL (ref 0–0.1)
BASOPHILS NFR BLD AUTO: 0.4 % (ref 0–1)
BILIRUB UR QL STRIP: NEGATIVE
BUN SERPL-MCNC: 8 MG/DL (ref 7–24)
CHLORIDE SERPL-SCNC: 111 MMOL/L (ref 98–107)
COLOR UR: YELLOW
CREAT SERPL-MCNC: 0.73 MG/DL (ref 0.55–1.02)
EOSINOPHIL # BLD AUTO: 0.1 10*3/UL (ref 0–0.4)
EOSINOPHIL # BLD AUTO: 2.2 % (ref 1–4)
EPI CELLS #/AREA URNS HPF: (no result) /[HPF]
ERYTHROCYTE [DISTWIDTH] IN BLOOD BY AUTOMATED COUNT: 13.1 % (ref 0–14.5)
GLUCOSE UR QL: NEGATIVE
HCT VFR BLD AUTO: 39 % (ref 37–47)
HGB BLD-MCNC: 12.9 G/DL (ref 12–16)
HGB UR QL STRIP: (no result)
KETONES UR QL STRIP: NEGATIVE
LEUKOCYTE ESTERASE UR QL STRIP: (no result)
LIPASE SERPL-CCNC: 93 U/L (ref 73–393)
LYMPHOCYTES # BLD AUTO: 1.4 10*3/UL (ref 1.3–4.4)
LYMPHOCYTES NFR BLD AUTO: 27.6 % (ref 27–41)
MCH RBC QN AUTO: 29.7 PG (ref 27–31)
MCHC RBC AUTO-ENTMCNC: 33.1 G/DL (ref 33–37)
MCV RBC AUTO: 89.9 FL (ref 81–99)
MONOCYTES # BLD AUTO: 0.6 10*3/UL (ref 0.1–1)
MONOCYTES NFR BLD MANUAL: 12 % (ref 3–9)
NEUT #: 2.8 10*3/UL (ref 2.3–7.9)
NEUT %: 57.4 % (ref 47–73)
NITRITE UR QL STRIP: NEGATIVE
NRBC BLD QL AUTO: 0 % (ref 0–0)
PH UR STRIP: 6.5 [PH] (ref 5–9)
PLATELET # BLD AUTO: 227 10*3/UL (ref 130–400)
PMV BLD AUTO: 9.6 FL (ref 9.6–12.3)
POTASSIUM SERPL-SCNC: 3.9 MMOL/L (ref 3.5–5.1)
PROT SERPL-MCNC: 7.2 GM/DL (ref 6.4–8.2)
RBC # BLD AUTO: 4.34 10*6/UL (ref 4.1–5.1)
SODIUM SERPL-SCNC: 140 MMOL/L (ref 136–145)
SP GR UR: 1.01 (ref 1–1.03)
UROBILINOGEN UR STRIP-MCNC: 0.2 E.U./DL (ref 0.2–1)
WBC #/AREA URNS HPF: (no result) WBC/HPF (ref 0–5)
WBC NRBC COR # BLD AUTO: 4.9 10*3/UL (ref 4.8–10.8)

## 2020-02-04 ENCOUNTER — HOSPITAL ENCOUNTER (OUTPATIENT)
Dept: HOSPITAL 83 - LAB | Age: 30
Discharge: HOME | End: 2020-02-04
Attending: FAMILY MEDICINE
Payer: COMMERCIAL

## 2020-02-04 DIAGNOSIS — E55.9: Primary | ICD-10-CM

## 2020-02-04 DIAGNOSIS — R53.83: ICD-10-CM

## 2020-02-04 DIAGNOSIS — R79.89: ICD-10-CM

## 2020-02-04 LAB
25(OH)D3 SERPL-MCNC: 38.4 NG/ML (ref 30–100)
ALBUMIN SERPL-MCNC: 4.2 GM/DL (ref 3.1–4.5)
ALP SERPL-CCNC: 46 U/L (ref 45–117)
ALT SERPL W P-5'-P-CCNC: 22 U/L (ref 12–78)
APPEARANCE UR: (no result)
AST SERPL-CCNC: 15 IU/L (ref 3–35)
BACTERIA #/AREA URNS HPF: (no result) /[HPF]
BASOPHILS # BLD AUTO: 0 10*3/UL (ref 0–0.1)
BASOPHILS NFR BLD AUTO: 0.4 % (ref 0–1)
BILIRUB UR QL STRIP: NEGATIVE
BUN SERPL-MCNC: 13 MG/DL (ref 7–24)
CHLORIDE SERPL-SCNC: 107 MMOL/L (ref 98–107)
CHOLEST SERPL-MCNC: 165 MG/DL (ref ?–200)
COLOR UR: YELLOW
CREAT SERPL-MCNC: 0.74 MG/DL (ref 0.55–1.02)
EOSINOPHIL # BLD AUTO: 0.2 10*3/UL (ref 0–0.4)
EOSINOPHIL # BLD AUTO: 3.1 % (ref 1–4)
EPI CELLS #/AREA URNS HPF: (no result) /[HPF]
ERYTHROCYTE [DISTWIDTH] IN BLOOD BY AUTOMATED COUNT: 13.2 % (ref 0–14.5)
FERRITIN SERPL-MCNC: 7.5 NG/ML (ref 10–291)
GGT SERPL-CCNC: 9 U/L (ref 5–55)
GLUCOSE UR QL: NEGATIVE
HCT VFR BLD AUTO: 41.1 % (ref 37–47)
HDLC SERPL-MCNC: 63 MG/DL (ref 40–60)
HGB BLD-MCNC: 13.5 G/DL (ref 12–16)
HGB UR QL STRIP: NEGATIVE
IRON SERPL-MCNC: 23 UG/DL (ref 50–170)
KETONES UR QL STRIP: NEGATIVE
LDLC SERPL DIRECT ASSAY-MCNC: 91 MG/DL (ref 9–159)
LEUKOCYTE ESTERASE UR QL STRIP: NEGATIVE
LYMPHOCYTES # BLD AUTO: 2.2 10*3/UL (ref 1.3–4.4)
LYMPHOCYTES NFR BLD AUTO: 30.2 % (ref 27–41)
MCH RBC QN AUTO: 30.2 PG (ref 27–31)
MCHC RBC AUTO-ENTMCNC: 32.8 G/DL (ref 33–37)
MCV RBC AUTO: 91.9 FL (ref 81–99)
MONOCYTES # BLD AUTO: 0.7 10*3/UL (ref 0.1–1)
MONOCYTES NFR BLD MANUAL: 9 % (ref 3–9)
NEUT #: 4.2 10*3/UL (ref 2.3–7.9)
NEUT %: 57.2 % (ref 47–73)
NITRITE UR QL STRIP: NEGATIVE
NRBC BLD QL AUTO: 0 % (ref 0–0)
PH UR STRIP: 6 [PH] (ref 5–9)
PLATELET # BLD AUTO: 255 10*3/UL (ref 130–400)
PMV BLD AUTO: 10 FL (ref 9.6–12.3)
POTASSIUM SERPL-SCNC: 3.5 MMOL/L (ref 3.5–5.1)
PROT SERPL-MCNC: 7.9 GM/DL (ref 6.4–8.2)
RBC # BLD AUTO: 4.47 10*6/UL (ref 4.1–5.1)
RBC #/AREA URNS HPF: (no result) RBC/HPF (ref 0–2)
RETICS/RBC NFR AUTO: 1.86 % (ref 0.5–2.5)
SODIUM SERPL-SCNC: 138 MMOL/L (ref 136–145)
SP GR UR: 1.01 (ref 1–1.03)
TIBC SERPL-MCNC: 394 UG/DL (ref 250–450)
TRIGL SERPL-MCNC: 56 MG/DL (ref ?–150)
TSH SERPL DL<=0.005 MIU/L-ACNC: 1.29 UIU/ML (ref 0.36–4.75)
URATE SERPL-MCNC: 2.7 MG/DL (ref 2.6–6)
UROBILINOGEN UR STRIP-MCNC: 0.2 E.U./DL (ref 0.2–1)
VITAMIN B12: 514 PG/ML (ref 247–911)
VLDLC SERPL CALC-MCNC: 11 MG/DL (ref 6–40)
WBC #/AREA URNS HPF: (no result) WBC/HPF (ref 0–5)
WBC NRBC COR # BLD AUTO: 7.3 10*3/UL (ref 4.8–10.8)

## 2020-02-05 LAB
AFP-TM SERPL-MCNC: 1 NG/ML (ref 0–8.3)
DSDNA AB SER-ACNC: 1 IU/ML (ref 0–9)
HEPATITIS C VIRUS ANTIBODY: <0.1 S/CO (ref 0–0.9)
RHEUMATOID FACT SERPL-ACNC: <10 IU/ML (ref 0–13.9)

## 2020-07-03 ENCOUNTER — HOSPITAL ENCOUNTER (OUTPATIENT)
Dept: HOSPITAL 83 - LAB | Age: 30
Discharge: HOME | End: 2020-07-03
Attending: FAMILY MEDICINE
Payer: COMMERCIAL

## 2020-07-03 DIAGNOSIS — R53.83: ICD-10-CM

## 2020-07-03 DIAGNOSIS — E78.5: ICD-10-CM

## 2020-07-03 DIAGNOSIS — E55.9: ICD-10-CM

## 2020-07-03 DIAGNOSIS — R79.89: Primary | ICD-10-CM

## 2020-07-03 LAB
25(OH)D3 SERPL-MCNC: 58.9 NG/ML (ref 30–100)
ALBUMIN SERPL-MCNC: 4.1 GM/DL (ref 3.1–4.5)
ALP SERPL-CCNC: 42 U/L (ref 45–117)
ALT SERPL W P-5'-P-CCNC: 17 U/L (ref 12–78)
APPEARANCE UR: (no result)
AST SERPL-CCNC: 13 IU/L (ref 3–35)
BACTERIA #/AREA URNS HPF: (no result) /[HPF]
BASOPHILS # BLD AUTO: 0 10*3/UL (ref 0–0.1)
BASOPHILS NFR BLD AUTO: 0.7 % (ref 0–1)
BILIRUB UR QL STRIP: NEGATIVE
BUN SERPL-MCNC: 18 MG/DL (ref 7–24)
CHLORIDE SERPL-SCNC: 108 MMOL/L (ref 98–107)
CHOLEST SERPL-MCNC: 152 MG/DL (ref ?–200)
COLOR UR: YELLOW
CREAT SERPL-MCNC: 0.75 MG/DL (ref 0.55–1.02)
EOSINOPHIL # BLD AUTO: 0.5 10*3/UL (ref 0–0.4)
EOSINOPHIL # BLD AUTO: 11.4 % (ref 1–4)
EPI CELLS #/AREA URNS HPF: (no result) /[HPF]
ERYTHROCYTE [DISTWIDTH] IN BLOOD BY AUTOMATED COUNT: 13.2 % (ref 0–14.5)
FERRITIN SERPL-MCNC: 16 NG/ML (ref 10–291)
GGT SERPL-CCNC: 3 U/L (ref 5–55)
GLUCOSE UR QL: NEGATIVE
HCT VFR BLD AUTO: 40.9 % (ref 37–47)
HDLC SERPL-MCNC: 49 MG/DL (ref 40–60)
HGB UR QL STRIP: NEGATIVE
IRON SERPL-MCNC: 40 UG/DL (ref 50–170)
KETONES UR QL STRIP: (no result)
LDLC SERPL DIRECT ASSAY-MCNC: 92 MG/DL (ref 9–159)
LEUKOCYTE ESTERASE UR QL STRIP: (no result)
LYMPHOCYTES # BLD AUTO: 1.1 10*3/UL (ref 1.3–4.4)
LYMPHOCYTES NFR BLD AUTO: 24.7 % (ref 27–41)
MCH RBC QN AUTO: 29.6 PG (ref 27–31)
MCHC RBC AUTO-ENTMCNC: 33.3 G/DL (ref 33–37)
MCV RBC AUTO: 89.1 FL (ref 81–99)
MONOCYTES # BLD AUTO: 0.5 10*3/UL (ref 0.1–1)
MONOCYTES NFR BLD MANUAL: 10.3 % (ref 3–9)
MUCOUS THREADS URNS QL MICRO: (no result)
NEUT #: 2.4 10*3/UL (ref 2.3–7.9)
NEUT %: 52.7 % (ref 47–73)
NITRITE UR QL STRIP: NEGATIVE
NRBC BLD QL AUTO: 0 % (ref 0–0)
PH UR STRIP: 6.5 [PH] (ref 5–9)
PLATELET # BLD AUTO: 226 10*3/UL (ref 130–400)
PMV BLD AUTO: 10.3 FL (ref 9.6–12.3)
POTASSIUM SERPL-SCNC: 4.3 MMOL/L (ref 3.5–5.1)
PROT SERPL-MCNC: 7.6 GM/DL (ref 6.4–8.2)
RBC # BLD AUTO: 4.59 10*6/UL (ref 4.1–5.1)
RBC #/AREA URNS HPF: (no result) RBC/HPF (ref 0–2)
RETICS/RBC NFR AUTO: 1.97 % (ref 0.5–2.5)
SODIUM SERPL-SCNC: 140 MMOL/L (ref 136–145)
SP GR UR: 1.02 (ref 1–1.03)
TIBC SERPL-MCNC: 325 UG/DL (ref 250–450)
TRIGL SERPL-MCNC: 55 MG/DL (ref ?–150)
TSH SERPL DL<=0.005 MIU/L-ACNC: 0.71 UIU/ML (ref 0.36–4.75)
URATE SERPL-MCNC: 4.4 MG/DL (ref 2.6–6)
UROBILINOGEN UR STRIP-MCNC: 0.2 E.U./DL (ref 0.2–1)
VITAMIN B12: 719 PG/ML (ref 247–911)
VLDLC SERPL CALC-MCNC: 11 MG/DL (ref 6–40)
WBC #/AREA URNS HPF: (no result) WBC/HPF (ref 0–5)
WBC NRBC COR # BLD AUTO: 4.6 10*3/UL (ref 4.8–10.8)

## 2020-07-04 LAB
DSDNA AB SER-ACNC: <1 IU/ML (ref 0–9)
RHEUMATOID FACT SERPL-ACNC: <10 IU/ML (ref 0–13.9)

## 2020-07-22 ENCOUNTER — HOSPITAL ENCOUNTER (OUTPATIENT)
Dept: HOSPITAL 83 - COVID19 | Age: 30
Discharge: HOME | End: 2020-07-22
Attending: FAMILY MEDICINE
Payer: COMMERCIAL

## 2020-07-22 DIAGNOSIS — R50.9: Primary | ICD-10-CM

## 2020-07-22 DIAGNOSIS — Z20.828: ICD-10-CM

## 2020-08-01 ENCOUNTER — HOSPITAL ENCOUNTER (EMERGENCY)
Dept: HOSPITAL 83 - ED | Age: 30
Discharge: HOME | End: 2020-08-01
Payer: COMMERCIAL

## 2020-08-01 VITALS — BODY MASS INDEX: 25.3 KG/M2 | HEIGHT: 60.98 IN | WEIGHT: 134 LBS

## 2020-08-01 DIAGNOSIS — Z79.899: ICD-10-CM

## 2020-08-01 DIAGNOSIS — B27.90: Primary | ICD-10-CM

## 2020-08-01 LAB
ALBUMIN SERPL-MCNC: 4 GM/DL (ref 3.1–4.5)
ALP SERPL-CCNC: 46 U/L (ref 45–117)
ALT SERPL W P-5'-P-CCNC: 18 U/L (ref 12–78)
APPEARANCE UR: (no result)
AST SERPL-CCNC: 17 IU/L (ref 3–35)
BACTERIA #/AREA URNS HPF: (no result) /[HPF]
BASOPHILS # BLD AUTO: 0 10*3/UL (ref 0–0.1)
BASOPHILS NFR BLD AUTO: 0.2 % (ref 0–1)
BILIRUB UR QL STRIP: NEGATIVE
BUN SERPL-MCNC: 9 MG/DL (ref 7–24)
CHLORIDE SERPL-SCNC: 106 MMOL/L (ref 98–107)
COLOR UR: YELLOW
CREAT SERPL-MCNC: 0.77 MG/DL (ref 0.55–1.02)
EOSINOPHIL # BLD AUTO: 0 % (ref 1–4)
EOSINOPHIL # BLD AUTO: 0 10*3/UL (ref 0–0.4)
EPI CELLS #/AREA URNS HPF: (no result) /[HPF]
ERYTHROCYTE [DISTWIDTH] IN BLOOD BY AUTOMATED COUNT: 13.5 % (ref 0–14.5)
GLUCOSE UR QL: NEGATIVE
HCT VFR BLD AUTO: 41.8 % (ref 37–47)
HGB UR QL STRIP: (no result)
KETONES UR QL STRIP: (no result)
LEUKOCYTE ESTERASE UR QL STRIP: (no result)
LYMPHOCYTES # BLD AUTO: 1 10*3/UL (ref 1.3–4.4)
LYMPHOCYTES NFR BLD AUTO: 9.3 % (ref 27–41)
MCH RBC QN AUTO: 28.9 PG (ref 27–31)
MCHC RBC AUTO-ENTMCNC: 32.3 G/DL (ref 33–37)
MCV RBC AUTO: 89.5 FL (ref 81–99)
MONOCYTES # BLD AUTO: 1.4 10*3/UL (ref 0.1–1)
MONOCYTES NFR BLD MANUAL: 12.6 % (ref 3–9)
NEUT #: 8.5 10*3/UL (ref 2.3–7.9)
NEUT %: 77.3 % (ref 47–73)
NITRITE UR QL STRIP: NEGATIVE
NRBC BLD QL AUTO: 0 % (ref 0–0)
PH UR STRIP: 5 [PH] (ref 5–9)
PLATELET # BLD AUTO: 163 10*3/UL (ref 130–400)
PMV BLD AUTO: 9.8 FL (ref 9.6–12.3)
POTASSIUM SERPL-SCNC: 4 MMOL/L (ref 3.5–5.1)
PROT SERPL-MCNC: 8 GM/DL (ref 6.4–8.2)
RBC # BLD AUTO: 4.67 10*6/UL (ref 4.1–5.1)
RBC #/AREA URNS HPF: (no result) RBC/HPF (ref 0–2)
SODIUM SERPL-SCNC: 136 MMOL/L (ref 136–145)
SP GR UR: 1.03 (ref 1–1.03)
UROBILINOGEN UR STRIP-MCNC: 0.2 E.U./DL (ref 0.2–1)
WBC #/AREA URNS HPF: (no result) WBC/HPF (ref 0–5)
WBC NRBC COR # BLD AUTO: 11 10*3/UL (ref 4.8–10.8)

## 2020-08-02 ENCOUNTER — HOSPITAL ENCOUNTER (EMERGENCY)
Dept: HOSPITAL 83 - ED | Age: 30
Discharge: HOME | End: 2020-08-02
Payer: COMMERCIAL

## 2020-08-02 VITALS — BODY MASS INDEX: 22.33 KG/M2 | HEIGHT: 65 IN | WEIGHT: 134 LBS

## 2020-08-02 DIAGNOSIS — Z88.8: ICD-10-CM

## 2020-08-02 DIAGNOSIS — Z88.2: ICD-10-CM

## 2020-08-02 DIAGNOSIS — N39.0: Primary | ICD-10-CM

## 2020-08-02 DIAGNOSIS — B27.90: ICD-10-CM

## 2020-08-02 DIAGNOSIS — Z79.899: ICD-10-CM

## 2020-08-02 DIAGNOSIS — Z88.1: ICD-10-CM

## 2020-08-04 ENCOUNTER — HOSPITAL ENCOUNTER (OUTPATIENT)
Dept: HOSPITAL 83 - COVID19 | Age: 30
Discharge: HOME | End: 2020-08-04
Attending: FAMILY MEDICINE
Payer: COMMERCIAL

## 2020-08-04 DIAGNOSIS — Z03.818: Primary | ICD-10-CM

## 2020-10-15 ENCOUNTER — HOSPITAL ENCOUNTER (OUTPATIENT)
Dept: HOSPITAL 83 - COVID19 | Age: 30
End: 2020-10-15
Attending: FAMILY MEDICINE
Payer: COMMERCIAL

## 2020-10-15 DIAGNOSIS — F50.9: Primary | ICD-10-CM

## 2020-10-15 DIAGNOSIS — Z20.828: ICD-10-CM

## 2021-02-08 ENCOUNTER — HOSPITAL ENCOUNTER (OUTPATIENT)
Dept: HOSPITAL 83 - COVID19 | Age: 31
Discharge: HOME | End: 2021-02-08
Attending: FAMILY MEDICINE
Payer: COMMERCIAL

## 2021-02-08 DIAGNOSIS — Z20.822: Primary | ICD-10-CM

## 2021-03-19 ENCOUNTER — HOSPITAL ENCOUNTER (OUTPATIENT)
Age: 31
Discharge: HOME | End: 2021-03-21

## 2021-03-19 LAB
ABO/RH: NORMAL
ANION GAP SERPL CALCULATED.3IONS-SCNC: 11 MMOL/L (ref 7–16)
ANTIBODY SCREEN: NORMAL
BUN BLDV-MCNC: 11 MG/DL (ref 6–20)
CALCIUM SERPL-MCNC: 9.4 MG/DL (ref 8.6–10.2)
CHLORIDE BLD-SCNC: 101 MMOL/L (ref 98–107)
CO2: 25 MMOL/L (ref 22–29)
CREAT SERPL-MCNC: 0.7 MG/DL (ref 0.5–1)
GFR AFRICAN AMERICAN: >60
GFR NON-AFRICAN AMERICAN: >60 ML/MIN/1.73
GLUCOSE BLD-MCNC: 77 MG/DL (ref 74–99)
HCT VFR BLD CALC: 44 % (ref 34–48)
HEMOGLOBIN: 14.5 G/DL (ref 11.5–15.5)
MCH RBC QN AUTO: 30.7 PG (ref 26–35)
MCHC RBC AUTO-ENTMCNC: 33 % (ref 32–34.5)
MCV RBC AUTO: 93.2 FL (ref 80–99.9)
PDW BLD-RTO: 13.2 FL (ref 11.5–15)
PLATELET # BLD: 267 E9/L (ref 130–450)
PMV BLD AUTO: 10.4 FL (ref 7–12)
POTASSIUM SERPL-SCNC: 4.4 MMOL/L (ref 3.5–5)
RBC # BLD: 4.72 E12/L (ref 3.5–5.5)
SODIUM BLD-SCNC: 137 MMOL/L (ref 132–146)
WBC # BLD: 6.7 E9/L (ref 4.5–11.5)

## 2021-03-19 PROCEDURE — 80048 BASIC METABOLIC PNL TOTAL CA: CPT

## 2021-03-19 PROCEDURE — 85027 COMPLETE CBC AUTOMATED: CPT

## 2021-03-19 PROCEDURE — 87088 URINE BACTERIA CULTURE: CPT

## 2021-03-19 PROCEDURE — 87081 CULTURE SCREEN ONLY: CPT

## 2021-03-19 PROCEDURE — 86900 BLOOD TYPING SEROLOGIC ABO: CPT

## 2021-03-19 PROCEDURE — 86901 BLOOD TYPING SEROLOGIC RH(D): CPT

## 2021-03-19 PROCEDURE — 86850 RBC ANTIBODY SCREEN: CPT

## 2021-03-21 LAB
MRSA CULTURE ONLY: NORMAL
URINE CULTURE, ROUTINE: NORMAL

## 2021-03-26 ENCOUNTER — HOSPITAL ENCOUNTER (OUTPATIENT)
Dept: HOSPITAL 83 - ED | Age: 31
Setting detail: OBSERVATION
LOS: 1 days | Discharge: HOME | End: 2021-03-27
Attending: STUDENT IN AN ORGANIZED HEALTH CARE EDUCATION/TRAINING PROGRAM | Admitting: STUDENT IN AN ORGANIZED HEALTH CARE EDUCATION/TRAINING PROGRAM
Payer: COMMERCIAL

## 2021-03-26 VITALS — DIASTOLIC BLOOD PRESSURE: 87 MMHG

## 2021-03-26 VITALS — SYSTOLIC BLOOD PRESSURE: 134 MMHG | DIASTOLIC BLOOD PRESSURE: 86 MMHG

## 2021-03-26 VITALS — WEIGHT: 176 LBS | HEIGHT: 60.98 IN | BODY MASS INDEX: 33.23 KG/M2

## 2021-03-26 VITALS — SYSTOLIC BLOOD PRESSURE: 132 MMHG | DIASTOLIC BLOOD PRESSURE: 78 MMHG

## 2021-03-26 VITALS — DIASTOLIC BLOOD PRESSURE: 89 MMHG

## 2021-03-26 VITALS — DIASTOLIC BLOOD PRESSURE: 67 MMHG

## 2021-03-26 VITALS — DIASTOLIC BLOOD PRESSURE: 86 MMHG

## 2021-03-26 DIAGNOSIS — R10.30: ICD-10-CM

## 2021-03-26 DIAGNOSIS — E87.8: ICD-10-CM

## 2021-03-26 DIAGNOSIS — N39.0: Primary | ICD-10-CM

## 2021-03-26 DIAGNOSIS — F41.9: ICD-10-CM

## 2021-03-26 DIAGNOSIS — K58.9: ICD-10-CM

## 2021-03-26 DIAGNOSIS — E44.0: ICD-10-CM

## 2021-03-26 DIAGNOSIS — K21.9: ICD-10-CM

## 2021-03-26 DIAGNOSIS — E44.1: ICD-10-CM

## 2021-03-26 DIAGNOSIS — R00.0: ICD-10-CM

## 2021-03-26 DIAGNOSIS — R11.0: ICD-10-CM

## 2021-03-26 DIAGNOSIS — N32.81: ICD-10-CM

## 2021-03-26 LAB
ALBUMIN SERPL-MCNC: 3.2 GM/DL (ref 3.1–4.5)
ALP SERPL-CCNC: 43 U/L (ref 45–117)
ALT SERPL W P-5'-P-CCNC: 23 U/L (ref 12–78)
AST SERPL-CCNC: 11 IU/L (ref 3–35)
BACTERIA #/AREA URNS HPF: (no result) /[HPF]
BASOPHILS # BLD AUTO: 0 10*3/UL (ref 0–0.1)
BASOPHILS NFR BLD AUTO: 0.3 % (ref 0–1)
BUN SERPL-MCNC: 13 MG/DL (ref 7–24)
CHLORIDE SERPL-SCNC: 109 MMOL/L (ref 98–107)
CREAT SERPL-MCNC: 0.71 MG/DL (ref 0.55–1.02)
EOSINOPHIL # BLD AUTO: 0.1 10*3/UL (ref 0–0.4)
EOSINOPHIL # BLD AUTO: 1.1 % (ref 1–4)
EPI CELLS #/AREA URNS HPF: (no result) /[HPF]
ERYTHROCYTE [DISTWIDTH] IN BLOOD BY AUTOMATED COUNT: 13.1 % (ref 0–14.5)
HCT VFR BLD AUTO: 41.6 % (ref 37–47)
LEUKOCYTE ESTERASE UR QL STRIP: (no result)
LYMPHOCYTES # BLD AUTO: 1.4 10*3/UL (ref 1.3–4.4)
LYMPHOCYTES NFR BLD AUTO: 20.3 % (ref 27–41)
MCH RBC QN AUTO: 30.8 PG (ref 27–31)
MCHC RBC AUTO-ENTMCNC: 33.4 G/DL (ref 33–37)
MCV RBC AUTO: 92 FL (ref 81–99)
MONOCYTES # BLD AUTO: 0.7 10*3/UL (ref 0.1–1)
MONOCYTES NFR BLD MANUAL: 9.8 % (ref 3–9)
MUCOUS THREADS URNS QL MICRO: (no result)
NEUT #: 4.8 10*3/UL (ref 2.3–7.9)
NEUT %: 67.9 % (ref 47–73)
NRBC BLD QL AUTO: 0 10*3/UL (ref 0–0)
PH UR STRIP: 5.5 [PH] (ref 4.5–8)
PLATELET # BLD AUTO: 245 10*3/UL (ref 130–400)
PMV BLD AUTO: 9.4 FL (ref 9.6–12.3)
POTASSIUM SERPL-SCNC: 4 MMOL/L (ref 3.5–5.1)
PROT SERPL-MCNC: 7.2 GM/DL (ref 6.4–8.2)
RBC # BLD AUTO: 4.52 10*6/UL (ref 4.1–5.1)
RBC #/AREA URNS HPF: (no result) RBC/HPF (ref 0–2)
SODIUM SERPL-SCNC: 138 MMOL/L (ref 136–145)
SP GR UR: >= 1.03 (ref 1–1.03)
UROBILINOGEN UR STRIP-MCNC: 1 E.U./DL (ref 0–1)
WBC #/AREA URNS HPF: (no result) WBC/HPF (ref 0–5)
WBC NRBC COR # BLD AUTO: 7 10*3/UL (ref 4.8–10.8)

## 2021-03-27 VITALS — SYSTOLIC BLOOD PRESSURE: 123 MMHG | DIASTOLIC BLOOD PRESSURE: 63 MMHG

## 2021-03-27 VITALS — DIASTOLIC BLOOD PRESSURE: 74 MMHG

## 2021-03-27 LAB
ALBUMIN SERPL-MCNC: 3 GM/DL (ref 3.1–4.5)
ALP SERPL-CCNC: 45 U/L (ref 45–117)
ALT SERPL W P-5'-P-CCNC: 41 U/L (ref 12–78)
AST SERPL-CCNC: 32 IU/L (ref 3–35)
BASOPHILS # BLD AUTO: 0 10*3/UL (ref 0–0.1)
BASOPHILS NFR BLD AUTO: 0.4 % (ref 0–1)
BUN SERPL-MCNC: 8 MG/DL (ref 7–24)
CHLORIDE SERPL-SCNC: 110 MMOL/L (ref 98–107)
CHOLEST SERPL-MCNC: 188 MG/DL (ref ?–200)
CREAT SERPL-MCNC: 0.61 MG/DL (ref 0.55–1.02)
EOSINOPHIL # BLD AUTO: 0.1 10*3/UL (ref 0–0.4)
EOSINOPHIL # BLD AUTO: 2.5 % (ref 1–4)
ERYTHROCYTE [DISTWIDTH] IN BLOOD BY AUTOMATED COUNT: 13 % (ref 0–14.5)
HCT VFR BLD AUTO: 39.6 % (ref 37–47)
HDLC SERPL-MCNC: 44 MG/DL (ref 40–60)
LDLC SERPL DIRECT ASSAY-MCNC: 127 MG/DL (ref 9–159)
LYMPHOCYTES # BLD AUTO: 1.3 10*3/UL (ref 1.3–4.4)
LYMPHOCYTES NFR BLD AUTO: 23.5 % (ref 27–41)
MCH RBC QN AUTO: 30.6 PG (ref 27–31)
MCHC RBC AUTO-ENTMCNC: 33.6 G/DL (ref 33–37)
MCV RBC AUTO: 91 FL (ref 81–99)
MONOCYTES # BLD AUTO: 0.6 10*3/UL (ref 0.1–1)
MONOCYTES NFR BLD MANUAL: 10.2 % (ref 3–9)
NEUT #: 3.5 10*3/UL (ref 2.3–7.9)
NEUT %: 63 % (ref 47–73)
NRBC BLD QL AUTO: 0 10*3/UL (ref 0–0)
PLATELET # BLD AUTO: 241 10*3/UL (ref 130–400)
PMV BLD AUTO: 9.8 FL (ref 9.6–12.3)
POTASSIUM SERPL-SCNC: 3.9 MMOL/L (ref 3.5–5.1)
PROT SERPL-MCNC: 6.5 GM/DL (ref 6.4–8.2)
RBC # BLD AUTO: 4.35 10*6/UL (ref 4.1–5.1)
SODIUM SERPL-SCNC: 139 MMOL/L (ref 136–145)
T4 FREE SERPL-MCNC: 1.08 NG/DL (ref 0.76–1.46)
TRIGL SERPL-MCNC: 85 MG/DL (ref ?–150)
TSH SERPL DL<=0.005 MIU/L-ACNC: 1.14 UIU/ML (ref 0.36–4.75)
VITAMIN B12: 321 PG/ML (ref 247–911)
VLDLC SERPL CALC-MCNC: 17 MG/DL (ref 6–40)
WBC NRBC COR # BLD AUTO: 5.6 10*3/UL (ref 4.8–10.8)

## 2021-06-02 ENCOUNTER — HOSPITAL ENCOUNTER (OUTPATIENT)
Dept: HOSPITAL 83 - LAB | Age: 31
Discharge: HOME | End: 2021-06-02
Attending: FAMILY MEDICINE
Payer: COMMERCIAL

## 2021-06-02 DIAGNOSIS — R53.83: ICD-10-CM

## 2021-06-02 DIAGNOSIS — E55.9: Primary | ICD-10-CM

## 2021-06-02 DIAGNOSIS — R79.89: ICD-10-CM

## 2021-06-02 DIAGNOSIS — E78.5: ICD-10-CM

## 2021-06-02 LAB
25(OH)D3 SERPL-MCNC: 41.9 NG/ML (ref 30–100)
ALBUMIN SERPL-MCNC: 3.8 GM/DL (ref 3.1–4.5)
ALP SERPL-CCNC: 38 U/L (ref 45–117)
ALT SERPL W P-5'-P-CCNC: 31 U/L (ref 12–78)
AST SERPL-CCNC: 9 IU/L (ref 3–35)
BASOPHILS # BLD AUTO: 0.1 10*3/UL (ref 0–0.1)
BASOPHILS NFR BLD AUTO: 0.8 % (ref 0–1)
BUN SERPL-MCNC: 10 MG/DL (ref 7–24)
CHLORIDE SERPL-SCNC: 104 MMOL/L (ref 98–107)
CHOLEST SERPL-MCNC: 258 MG/DL (ref ?–200)
CREAT SERPL-MCNC: 0.98 MG/DL (ref 0.55–1.02)
EOSINOPHIL # BLD AUTO: 0.1 10*3/UL (ref 0–0.4)
EOSINOPHIL # BLD AUTO: 0.9 % (ref 1–4)
ERYTHROCYTE [DISTWIDTH] IN BLOOD BY AUTOMATED COUNT: 12.8 % (ref 0–14.5)
FERRITIN SERPL-MCNC: 6.6 NG/ML (ref 10–291)
GGT SERPL-CCNC: 19 U/L (ref 5–55)
HCT VFR BLD AUTO: 44.3 % (ref 37–47)
IRON SERPL-MCNC: 148 UG/DL (ref 50–170)
LDLC SERPL DIRECT ASSAY-MCNC: 159 MG/DL (ref 9–159)
LYMPHOCYTES # BLD AUTO: 2.8 10*3/UL (ref 1.3–4.4)
LYMPHOCYTES NFR BLD AUTO: 42.2 % (ref 27–41)
MCH RBC QN AUTO: 31.9 PG (ref 27–31)
MCHC RBC AUTO-ENTMCNC: 33.6 G/DL (ref 33–37)
MCV RBC AUTO: 94.9 FL (ref 81–99)
MONOCYTES # BLD AUTO: 0.9 10*3/UL (ref 0.1–1)
MONOCYTES NFR BLD MANUAL: 12.9 % (ref 3–9)
NEUT #: 2.8 10*3/UL (ref 2.3–7.9)
NEUT %: 42.1 % (ref 47–73)
NRBC BLD QL AUTO: 0 % (ref 0–0)
PLATELET # BLD AUTO: 291 10*3/UL (ref 130–400)
PMV BLD AUTO: 9.5 FL (ref 9.6–12.3)
POTASSIUM SERPL-SCNC: 3.3 MMOL/L (ref 3.5–5.1)
PROT SERPL-MCNC: 7.8 GM/DL (ref 6.4–8.2)
RBC # BLD AUTO: 4.67 10*6/UL (ref 4.1–5.1)
RETICS/RBC NFR AUTO: 2.66 % (ref 0.5–2.5)
SODIUM SERPL-SCNC: 138 MMOL/L (ref 136–145)
TIBC SERPL-MCNC: 438 UG/DL (ref 250–450)
TRIGL SERPL-MCNC: 154 MG/DL (ref ?–150)
TSH SERPL DL<=0.005 MIU/L-ACNC: 2.16 UIU/ML (ref 0.36–4.75)
URATE SERPL-MCNC: 3.5 MG/DL (ref 2.6–6)
VITAMIN B12: 374 PG/ML (ref 247–911)
WBC NRBC COR # BLD AUTO: 6.7 10*3/UL (ref 4.8–10.8)

## 2021-06-03 LAB
DSDNA AB SER-ACNC: <1 IU/ML (ref 0–9)
RHEUMATOID FACT SERPL-ACNC: <10 IU/ML (ref 0–13.9)

## 2021-06-10 ENCOUNTER — HOSPITAL ENCOUNTER (OUTPATIENT)
Age: 31
Discharge: HOME | End: 2021-06-12

## 2021-06-10 LAB
ABO/RH: NORMAL
ANTIBODY SCREEN: NORMAL

## 2021-06-10 PROCEDURE — 86901 BLOOD TYPING SEROLOGIC RH(D): CPT

## 2021-06-10 PROCEDURE — 86900 BLOOD TYPING SEROLOGIC ABO: CPT

## 2021-06-10 PROCEDURE — 86850 RBC ANTIBODY SCREEN: CPT

## 2021-06-10 PROCEDURE — 88307 TISSUE EXAM BY PATHOLOGIST: CPT

## 2021-06-11 ENCOUNTER — HOSPITAL ENCOUNTER (OUTPATIENT)
Age: 31
Discharge: HOME | End: 2021-06-13

## 2021-06-11 LAB
ANION GAP SERPL CALCULATED.3IONS-SCNC: 7 MMOL/L (ref 7–16)
BUN BLDV-MCNC: 4 MG/DL (ref 6–20)
CALCIUM SERPL-MCNC: 9 MG/DL (ref 8.6–10.2)
CHLORIDE BLD-SCNC: 107 MMOL/L (ref 98–107)
CO2: 26 MMOL/L (ref 22–29)
CREAT SERPL-MCNC: 0.7 MG/DL (ref 0.5–1)
GFR AFRICAN AMERICAN: >60
GFR NON-AFRICAN AMERICAN: >60 ML/MIN/1.73
GLUCOSE BLD-MCNC: 76 MG/DL (ref 74–99)
HCT VFR BLD CALC: 35.9 % (ref 34–48)
HEMOGLOBIN: 12.1 G/DL (ref 11.5–15.5)
MCH RBC QN AUTO: 32.4 PG (ref 26–35)
MCHC RBC AUTO-ENTMCNC: 33.7 % (ref 32–34.5)
MCV RBC AUTO: 96.2 FL (ref 80–99.9)
PDW BLD-RTO: 12.5 FL (ref 11.5–15)
PLATELET # BLD: 204 E9/L (ref 130–450)
PMV BLD AUTO: 10.5 FL (ref 7–12)
POTASSIUM SERPL-SCNC: 4 MMOL/L (ref 3.5–5)
RBC # BLD: 3.73 E12/L (ref 3.5–5.5)
SODIUM BLD-SCNC: 140 MMOL/L (ref 132–146)
WBC # BLD: 7.8 E9/L (ref 4.5–11.5)

## 2021-06-11 PROCEDURE — 80048 BASIC METABOLIC PNL TOTAL CA: CPT

## 2021-06-11 PROCEDURE — 85027 COMPLETE CBC AUTOMATED: CPT

## 2021-06-12 ENCOUNTER — HOSPITAL ENCOUNTER (OUTPATIENT)
Age: 31
Discharge: HOME | End: 2021-06-14

## 2021-06-12 PROCEDURE — 87088 URINE BACTERIA CULTURE: CPT

## 2021-06-12 PROCEDURE — 81001 URINALYSIS AUTO W/SCOPE: CPT

## 2021-06-15 LAB — URINE CULTURE, ROUTINE: NORMAL

## 2021-12-11 ENCOUNTER — HOSPITAL ENCOUNTER (EMERGENCY)
Dept: HOSPITAL 83 - ED | Age: 31
LOS: 1 days | Discharge: HOME | End: 2021-12-12
Payer: COMMERCIAL

## 2021-12-11 VITALS — BODY MASS INDEX: 33.42 KG/M2 | HEIGHT: 60.98 IN | WEIGHT: 177 LBS

## 2021-12-11 DIAGNOSIS — Z88.6: ICD-10-CM

## 2021-12-11 DIAGNOSIS — Z79.899: ICD-10-CM

## 2021-12-11 DIAGNOSIS — Z88.1: ICD-10-CM

## 2021-12-11 DIAGNOSIS — M79.661: Primary | ICD-10-CM

## 2021-12-11 DIAGNOSIS — Z88.2: ICD-10-CM

## 2021-12-11 DIAGNOSIS — Z90.49: ICD-10-CM

## 2021-12-30 ENCOUNTER — HOSPITAL ENCOUNTER (OUTPATIENT)
Dept: HOSPITAL 83 - US | Age: 31
End: 2021-12-30
Attending: FAMILY MEDICINE
Payer: COMMERCIAL

## 2021-12-30 DIAGNOSIS — M79.604: Primary | ICD-10-CM

## 2022-02-02 ENCOUNTER — OFFICE VISIT (OUTPATIENT)
Dept: CARDIOLOGY CLINIC | Age: 32
End: 2022-02-02
Payer: COMMERCIAL

## 2022-02-02 VITALS
RESPIRATION RATE: 16 BRPM | DIASTOLIC BLOOD PRESSURE: 72 MMHG | SYSTOLIC BLOOD PRESSURE: 126 MMHG | HEART RATE: 99 BPM | HEIGHT: 61 IN | WEIGHT: 189 LBS | BODY MASS INDEX: 35.68 KG/M2

## 2022-02-02 DIAGNOSIS — R00.2 PALPITATIONS: ICD-10-CM

## 2022-02-02 DIAGNOSIS — R00.2 PALPITATIONS: Primary | ICD-10-CM

## 2022-02-02 DIAGNOSIS — R42 DIZZINESS: ICD-10-CM

## 2022-02-02 DIAGNOSIS — E66.9 OBESITY (BMI 35.0-39.9 WITHOUT COMORBIDITY): ICD-10-CM

## 2022-02-02 PROCEDURE — G8419 CALC BMI OUT NRM PARAM NOF/U: HCPCS | Performed by: INTERNAL MEDICINE

## 2022-02-02 PROCEDURE — G8484 FLU IMMUNIZE NO ADMIN: HCPCS | Performed by: INTERNAL MEDICINE

## 2022-02-02 PROCEDURE — G8428 CUR MEDS NOT DOCUMENT: HCPCS | Performed by: INTERNAL MEDICINE

## 2022-02-02 PROCEDURE — 93000 ELECTROCARDIOGRAM COMPLETE: CPT | Performed by: INTERNAL MEDICINE

## 2022-02-02 PROCEDURE — 1036F TOBACCO NON-USER: CPT | Performed by: INTERNAL MEDICINE

## 2022-02-02 PROCEDURE — 99204 OFFICE O/P NEW MOD 45 MIN: CPT | Performed by: INTERNAL MEDICINE

## 2022-02-02 RX ORDER — ZIPRASIDONE HYDROCHLORIDE 20 MG/1
CAPSULE ORAL
COMMUNITY
Start: 2021-12-20 | End: 2022-02-02

## 2022-02-02 RX ORDER — OLANZAPINE 10 MG/1
10 TABLET ORAL DAILY
COMMUNITY
End: 2022-02-02

## 2022-02-02 RX ORDER — ALBUTEROL SULFATE 90 UG/1
2 AEROSOL, METERED RESPIRATORY (INHALATION) EVERY 6 HOURS PRN
COMMUNITY

## 2022-02-02 RX ORDER — HYDROCHLOROTHIAZIDE 12.5 MG/1
12.5 CAPSULE, GELATIN COATED ORAL DAILY
COMMUNITY
End: 2022-02-02

## 2022-02-02 RX ORDER — CLONIDINE HYDROCHLORIDE 0.1 MG/1
1 TABLET ORAL 2 TIMES DAILY PRN
COMMUNITY
Start: 2021-12-06 | End: 2022-02-02

## 2022-02-02 RX ORDER — AMOXICILLIN 250 MG
1 CAPSULE ORAL PRN
COMMUNITY

## 2022-02-02 RX ORDER — RISPERIDONE 1 MG/1
TABLET, FILM COATED ORAL
COMMUNITY
Start: 2021-11-18 | End: 2022-02-02

## 2022-02-02 RX ORDER — TOLTERODINE TARTRATE 2 MG/1
1 TABLET, EXTENDED RELEASE ORAL 2 TIMES DAILY
COMMUNITY
Start: 2021-12-30

## 2022-02-02 RX ORDER — MECLIZINE HYDROCHLORIDE 25 MG/1
25 TABLET ORAL 2 TIMES DAILY
Qty: 30 TABLET | Refills: 0 | Status: SHIPPED | OUTPATIENT
Start: 2022-02-02 | End: 2022-02-12

## 2022-02-02 RX ORDER — ARIPIPRAZOLE 15 MG/1
1 TABLET ORAL DAILY
COMMUNITY
Start: 2021-12-06 | End: 2022-02-02

## 2022-02-02 RX ORDER — PENTOSAN POLYSULFATE SODIUM 100 MG/1
1 CAPSULE, GELATIN COATED ORAL
COMMUNITY
Start: 2021-12-30

## 2022-02-02 RX ORDER — HYDROXYZINE HYDROCHLORIDE 25 MG/1
25 TABLET, FILM COATED ORAL 3 TIMES DAILY PRN
COMMUNITY
End: 2022-02-02

## 2022-02-02 RX ORDER — DIVALPROEX SODIUM 125 MG/1
125 TABLET, DELAYED RELEASE ORAL 2 TIMES DAILY
COMMUNITY
End: 2022-02-02

## 2022-02-02 RX ORDER — LURASIDONE HYDROCHLORIDE 60 MG/1
TABLET, FILM COATED ORAL
COMMUNITY
Start: 2021-11-04 | End: 2022-02-02

## 2022-02-02 RX ORDER — PRAZOSIN HYDROCHLORIDE 1 MG/1
CAPSULE ORAL
COMMUNITY
Start: 2021-12-16 | End: 2022-02-02

## 2022-02-02 NOTE — PATIENT INSTRUCTIONS
 Continue all your medications at current doses.  Try taking meclizine 25 mg twice daily for 10 days.  We will schedule an echo, 48 hour holter.  I will get your lipid panel from PCP.  Restrict sodium intake to less than 2-2.5 g/day. Restrict fluid intake to less than 2.2 L/day. Goal BP is less than 130/80.  Please try to exercise for 150 minutes a week.  I will see you back in the office in 6 months. Please call the office at ([phone], option [phone]) if you have any questions.

## 2022-02-02 NOTE — PROGRESS NOTES
CHIEF COMPLAINT:   Chief Complaint   Patient presents with    New Patient     Referral Dr Irving Russo, palpitations, dizziness, SOB         HISTORY OF PRESENT ILLNESS: Patient is a 27 y.o. female seen at the request of Evan Lynn DO to establish care with me. She has a history of obesity, anxiety disorder, asthma, irritable bladder, GERD, no prior significant cardiac. She presents to me for further evaluation of the above-mentioned complaints. She has been having the symptoms for a few months to a year now. She states her symptoms started after she took her maternal booster shot. Her first Covid shot was [employer]. She complains of palpitations which she describes as a fluttering sensation. There are no aggravating or relieving factors. She also complains of dizziness which she describes as both a woozy sensation as well as a spinning sensation. This dizziness can be positional as well as associated with head movements. Earlier this week, she was walking up the stairs to check on her daughter. She reached the top of the stairs, she felt dizzy and lightheaded and felt like she was about to fall. She did fall onto a couple of shelves and then had to sit down for a while. She never lost consciousness. After a few minutes, she was able to walk back to her bedroom and go to sleep. She has not had any other falls or episodes of loss of consciousness. She does not feel the increased anxiety is contributing to her symptoms. She did have a history of seizures as a child. She is to have staring episodes. She does not have those episodes anymore and is currently not on any antiepileptic therapy. At today's office visit, Shedenies any pedal edema. The patient is capable of activities of daily living. There is dyspnea on more than moderate exertion. There is no orthopnea or PND. She is compliant with medications as well as diet and exercise regimen.             Past Medical History: Diagnosis Date    Anxiety     Bladder spasms     Depression     GERD (gastroesophageal reflux disease)     Hiatal hernia     Murmur, cardiac        Allergies   Allergen Reactions    Diflucan [Fluconazole] Other (See Comments)     Patient states it makes her whole mouth \"peel\"; also makes palms break out into a rash and itch     Bactrim [Sulfamethoxazole-Trimethoprim] Diarrhea and Rash    Clindamycin/Lincomycin Diarrhea and Rash    Sulfa Antibiotics Rash    Tramadol Rash       Current Outpatient Medications   Medication Sig Dispense Refill    ELMIRON 100 MG capsule Take 1 capsule by mouth 3 times daily (before meals)       tolterodine (DETROL) 2 MG tablet Take 1 tablet by mouth 2 times daily      albuterol sulfate HFA (VENTOLIN HFA) 108 (90 Base) MCG/ACT inhaler Inhale 2 puffs into the lungs every 6 hours as needed for Wheezing      senna-docusate (PERICOLACE) 8.6-50 MG per tablet Take 1 tablet by mouth as needed      meclizine (ANTIVERT) 25 MG tablet Take 1 tablet by mouth 2 times daily for 10 days 30 tablet 0    omeprazole (PRILOSEC) 40 MG delayed release capsule Take 40 mg by mouth daily       No current facility-administered medications for this visit.        Social History     Socioeconomic History    Marital status:      Spouse name: Not on file    Number of children: Not on file    Years of education: Not on file    Highest education level: Not on file   Occupational History    Not on file   Tobacco Use    Smoking status: Former Smoker     Types: Cigarettes     Quit date: 11/7/2018     Years since quitting: 3.2    Smokeless tobacco: Never Used   Vaping Use    Vaping Use: Never used   Substance and Sexual Activity    Alcohol use: Not Currently     Comment: occasionallu    Drug use: No    Sexual activity: Not on file   Other Topics Concern    Not on file   Social History Narrative    Not on file     Social Determinants of Health     Financial Resource Strain:    Annmarie Daily Difficulty of Paying Living Expenses: Not on file   Food Insecurity:     Worried About Running Out of Food in the Last Year: Not on file    Ran Out of Food in the Last Year: Not on file   Transportation Needs:     Lack of Transportation (Medical): Not on file    Lack of Transportation (Non-Medical): Not on file   Physical Activity:     Days of Exercise per Week: Not on file    Minutes of Exercise per Session: Not on file   Stress:     Feeling of Stress : Not on file   Social Connections:     Frequency of Communication with Friends and Family: Not on file    Frequency of Social Gatherings with Friends and Family: Not on file    Attends Che[employer] Services: Not on file    Active Member of [address] [place] [address] or Organizations: Not on file    Attends Club or Organization Meetings: Not on file    Marital Status: Not on file   Intimate Partner Violence:     Fear of Current or Ex-Partner: Not on file    Emotionally Abused: Not on file    Physically Abused: Not on file    Sexually Abused: Not on file   Housing Stability:     Unable to Pay for Housing in the Last Year: Not on file    Number of [place] in the Last Year: Not on file    Unstable Housing in the Last Year: Not on file       Family History   Problem Relation Age of Onset    Hypertension Mother     Hypertension Father        Review of Systems  Constitutional: Negative for fever, malaise/fatigue and weight loss. HENT: Negative for sore throat and tinnitus. Eyes: Negative for blurred vision and double vision. Respiratory: Negative for shortness of breath. Negative for cough and wheezing. Cardiovascular: As mentioned in HPI. Gastrointestinal: Negative for abdominal pain, heartburn, nausea and vomiting. Genitourinary: Negative. Musculoskeletal: Negative for back pain, joint pain and myalgias. Neurological: Negative for dizziness, tremors, loss of consciousness and headaches. Endo/Heme/Allergies: Negative.     Psychiatric/Behavioral: Negative for depression and suicidal ideas. Physical Exam   /72   Pulse 99   Resp 16   Ht 5' 1\" (1.549 m)   Wt 189 lb (85.7 kg)   BMI 35.71 kg/m²   Constitutional: Oriented to person, place, and time. Well-developed and well-nourished. No distress. Head: Normocephalic and atraumatic. Eyes: EOM are normal. Pupils are equal, round, and reactive to light. Neck: Normal range of motion. Neck supple. No hepatojugular reflux and no JVD present. Carotid bruit is not present. No tracheal deviation present. No thyromegaly present. Cardiovascular: Normal rate, regular rhythm, normal heart sounds and intact distal pulses. Exam reveals no gallop and no friction rub. No murmur heard. Pulmonary/Chest: Effort normal and breath sounds normal. No respiratory distress. No wheezes. No rales. No tenderness. Abdominal: Soft. Bowel sounds are normal. No distension and no mass. No tenderness. No rebound and no guarding. Musculoskeletal: Normal range of motion. No edema and no tenderness. Lymphadenopathy:   No cervical adenopathy. Neurological: Alert and oriented to person, place, and time. Skin: Skin is warm and dry. No rash noted. Not diaphoretic. No erythema. Psychiatric: Normal mood and affect. Behavior is normal.     Procedures and Testing  EKG: Done in the office today and reviewed by me. Sinus tachycardia. Early repolarization. ASSESSMENT:   Diagnosis Orders   1. Palpitations  EKG 12 lead    Holter Monitor 48 Hour    ECHO Complete 2D W Doppler W Color   2. Dizziness     3. Obesity (BMI 35.0-39.9 without comorbidity)          Plan:  1. Palpitations, dizziness: Her symptoms could be vertiginous in nature. EKG from today as above. There is a positional component as well. I will check orthostatic vital signs in the office today. I will put her on a 48-hour Holter monitor to look for any underlying arrhythmias. I will also do a trial of meclizine 25 twice daily for 10 days.   Should she have improvement, I will consider referring her to ENT for further evaluation. Counseling and reassurance provided. Further management based on results of the Holter monitor. I will also check an echocardiogram to assess biventricular function and to rule out any significant valvular abnormalities. 2.  She did have a lipid panel drawn with her PCP. I will have that faxed to my office for further review. 3.  Moderate obesity: Counseled about compliance with diet, exercise and weight loss. 4.  Blood pressure is at goal in the office today. Goal for her is less than 130/80. She is currently on no therapy for hypertension. Salt restriction counseling provided. Follow-up with me in the office in 6 months      Cheli Johnson MD  [facility]) Cardiology     NOTE: This report was transcribed using voice recognition software. Every effort was made to ensure accuracy; however, inadvertent computerized transcription errors may be present.

## 2022-02-21 DIAGNOSIS — R00.2 PALPITATIONS: ICD-10-CM

## 2022-02-22 ENCOUNTER — HOSPITAL ENCOUNTER (OUTPATIENT)
Dept: HOSPITAL 83 - CARD | Age: 32
Discharge: HOME | End: 2022-02-22
Attending: INTERNAL MEDICINE
Payer: COMMERCIAL

## 2022-02-22 DIAGNOSIS — I35.8: Primary | ICD-10-CM

## 2022-02-22 LAB
LEFT VENTRICULAR EJECTION FRACTION HIGH VALUE: NORMAL
LEFT VENTRICULAR EJECTION FRACTION MODE: NORMAL
LV EF: NORMAL %

## 2022-02-24 DIAGNOSIS — R00.2 PALPITATIONS: ICD-10-CM

## 2022-02-28 ENCOUNTER — TELEPHONE (OUTPATIENT)
Dept: CARDIOLOGY CLINIC | Age: 32
End: 2022-02-28

## 2022-02-28 NOTE — TELEPHONE ENCOUNTER
----- Message from Chrissy Sequeira MD sent at 2/28/2022 10:22 AM EST -----  Please let her know that her echocardiogram findings are within normal limits. Lets ask if the meclizine helped with the symptoms.

## 2022-02-28 NOTE — TELEPHONE ENCOUNTER
Called pt. And informed her echo results are WNL    And she stated that the meclizine tablets are helping. Informed her any further concerns to call the office and she agreed.

## 2022-03-07 ENCOUNTER — TELEPHONE (OUTPATIENT)
Dept: CARDIOLOGY CLINIC | Age: 32
End: 2022-03-07

## 2022-03-07 NOTE — TELEPHONE ENCOUNTER
PT returned call results given stated when she was taking the  Meclizine significant improvement.     After the 10 days   C/o   dizziness several days a week    SOB daily on exertion       Denies CP, SOB

## 2022-03-07 NOTE — TELEPHONE ENCOUNTER
----- Message from Emerson Blanc sent at 2/28/2022 12:37 PM EST -----    ----- Message -----  From: Tete Barrett MD  Sent: 2/28/2022  10:22 AM EST  To: Emerson Blanc    Please let her know that her Holter monitor did not show any significant arrhythmias. She does have elevated heart rates at times.   Lets ask if the meclizine helped with her symptoms

## 2022-11-25 ENCOUNTER — HOSPITAL ENCOUNTER (EMERGENCY)
Dept: HOSPITAL 83 - ED | Age: 32
Discharge: HOME | End: 2022-11-25
Payer: COMMERCIAL

## 2022-11-25 VITALS — HEIGHT: 55 IN | WEIGHT: 168 LBS

## 2022-11-25 DIAGNOSIS — Z90.49: ICD-10-CM

## 2022-11-25 DIAGNOSIS — U07.1: Primary | ICD-10-CM

## 2022-11-25 DIAGNOSIS — Z79.899: ICD-10-CM

## 2022-11-25 DIAGNOSIS — Z88.1: ICD-10-CM

## 2022-11-25 DIAGNOSIS — Z88.8: ICD-10-CM
